# Patient Record
Sex: FEMALE | Race: WHITE | NOT HISPANIC OR LATINO | Employment: FULL TIME | URBAN - METROPOLITAN AREA
[De-identification: names, ages, dates, MRNs, and addresses within clinical notes are randomized per-mention and may not be internally consistent; named-entity substitution may affect disease eponyms.]

---

## 2017-01-05 ENCOUNTER — HOSPITAL ENCOUNTER (OUTPATIENT)
Dept: MRI IMAGING | Facility: HOSPITAL | Age: 47
Discharge: HOME | End: 2017-01-05
Attending: FAMILY MEDICINE | Admitting: FAMILY MEDICINE

## 2017-12-27 ENCOUNTER — HOSPITAL ENCOUNTER (OUTPATIENT)
Dept: OTHER | Facility: HOSPITAL | Age: 47
Discharge: HOME | End: 2017-12-27
Attending: FAMILY MEDICINE | Admitting: FAMILY MEDICINE

## 2018-08-21 ENCOUNTER — HOSPITAL ENCOUNTER (OUTPATIENT)
Dept: GENERAL RADIOLOGY | Facility: HOSPITAL | Age: 48
Discharge: HOME | End: 2018-08-21
Attending: FAMILY MEDICINE | Admitting: FAMILY MEDICINE

## 2019-03-13 ENCOUNTER — HOSPITAL ENCOUNTER (OUTPATIENT)
Dept: PHYSICAL THERAPY | Facility: HOSPITAL | Age: 49
Setting detail: RECURRING SERIES
Discharge: HOME | End: 2019-05-23
Attending: FAMILY MEDICINE | Admitting: FAMILY MEDICINE

## 2019-09-16 DIAGNOSIS — B02.9 HERPES ZOSTER WITHOUT COMPLICATION: Primary | ICD-10-CM

## 2019-09-16 RX ORDER — VALACYCLOVIR HYDROCHLORIDE 1 G/1
1000 TABLET, FILM COATED ORAL EVERY 8 HOURS
Qty: 21 TABLET | Refills: 0 | Status: SHIPPED | OUTPATIENT
Start: 2019-09-16 | End: 2019-10-14

## 2019-09-16 NOTE — TELEPHONE ENCOUNTER
Patient  Called said has shingles- patient said needs medicine for the shingles---send to [facility] on [facility]--call patient to advise  [phone]--unable to come in due to work

## 2019-09-23 ENCOUNTER — OFFICE VISIT (OUTPATIENT)
Dept: FAMILY MEDICINE CLINIC | Facility: CLINIC | Age: 49
End: 2019-09-23

## 2019-09-23 VITALS
DIASTOLIC BLOOD PRESSURE: 82 MMHG | SYSTOLIC BLOOD PRESSURE: 130 MMHG | RESPIRATION RATE: 16 BRPM | HEIGHT: 66 IN | OXYGEN SATURATION: 99 % | TEMPERATURE: 98.1 F | WEIGHT: 263 LBS | BODY MASS INDEX: 42.27 KG/M2 | HEART RATE: 90 BPM

## 2019-09-23 DIAGNOSIS — B02.9 HERPES ZOSTER WITHOUT COMPLICATION: Primary | ICD-10-CM

## 2019-09-23 DIAGNOSIS — J20.8 ACUTE BRONCHITIS DUE TO OTHER SPECIFIED ORGANISMS: ICD-10-CM

## 2019-09-23 DIAGNOSIS — J45.21 MILD INTERMITTENT ASTHMA WITH ACUTE EXACERBATION: ICD-10-CM

## 2019-09-23 DIAGNOSIS — R05.9 COUGH: ICD-10-CM

## 2019-09-23 PROBLEM — K21.9 GERD (GASTROESOPHAGEAL REFLUX DISEASE): Status: ACTIVE | Noted: 2019-09-23

## 2019-09-23 PROBLEM — Z86.19 HISTORY OF VARICELLA: Status: ACTIVE | Noted: 2019-09-23

## 2019-09-23 PROBLEM — F43.20 ADULT SITUATIONAL STRESS DISORDER: Status: ACTIVE | Noted: 2019-09-23

## 2019-09-23 PROBLEM — M54.10 RADICULOPATHY: Status: ACTIVE | Noted: 2019-09-23

## 2019-09-23 PROBLEM — J30.1 ALLERGIC RHINITIS DUE TO POLLEN: Status: ACTIVE | Noted: 2019-09-23

## 2019-09-23 PROBLEM — M19.90 ARTHRITIS: Status: ACTIVE | Noted: 2019-09-23

## 2019-09-23 PROBLEM — M50.30 DDD (DEGENERATIVE DISC DISEASE), CERVICAL: Status: ACTIVE | Noted: 2019-09-23

## 2019-09-23 PROBLEM — R94.39 ABNORMAL STRESS TEST: Status: ACTIVE | Noted: 2019-09-23

## 2019-09-23 PROBLEM — M72.2 PLANTAR FASCIITIS, RIGHT: Status: ACTIVE | Noted: 2019-09-23

## 2019-09-23 PROBLEM — M15.9 GENERALIZED OSTEOARTHRITIS: Status: ACTIVE | Noted: 2019-09-23

## 2019-09-23 PROBLEM — I83.93 ASYMPTOMATIC VARICOSE VEINS OF BILATERAL LOWER EXTREMITIES: Status: ACTIVE | Noted: 2019-09-23

## 2019-09-23 PROBLEM — F41.9 ANXIETY: Status: ACTIVE | Noted: 2019-09-23

## 2019-09-23 PROBLEM — E66.3 OVERWEIGHT: Status: ACTIVE | Noted: 2019-09-23

## 2019-09-23 PROBLEM — M79.7 FIBROMYALGIA: Status: ACTIVE | Noted: 2019-09-23

## 2019-09-23 PROBLEM — M51.37 DEGENERATIVE DISC DISEASE AT L5-S1 LEVEL: Status: ACTIVE | Noted: 2019-09-23

## 2019-09-23 PROBLEM — Z83.3 FAMILY HISTORY OF DIABETES MELLITUS: Status: ACTIVE | Noted: 2019-09-23

## 2019-09-23 PROBLEM — D50.9 IRON DEFICIENCY ANEMIA: Status: ACTIVE | Noted: 2019-09-23

## 2019-09-23 PROCEDURE — 99214 OFFICE O/P EST MOD 30 MIN: CPT | Performed by: FAMILY MEDICINE

## 2019-09-23 RX ORDER — PREDNISONE 1 MG/1
5 TABLET ORAL DAILY
Qty: 45 TABLET | Refills: 0 | Status: SHIPPED | OUTPATIENT
Start: 2019-09-23 | End: 2019-10-14

## 2019-09-23 RX ORDER — BUPROPION HYDROCHLORIDE 150 MG/1
150 TABLET ORAL DAILY
Refills: 5 | COMMUNITY
Start: 2019-09-01 | End: 2019-10-28 | Stop reason: SDUPTHER

## 2019-09-23 RX ORDER — LORATADINE 10 MG/1
CAPSULE, LIQUID FILLED ORAL
COMMUNITY
Start: 2014-04-16 | End: 2019-10-14

## 2019-09-23 RX ORDER — AZITHROMYCIN 250 MG/1
TABLET, FILM COATED ORAL
Qty: 6 TABLET | Refills: 0 | Status: SHIPPED | OUTPATIENT
Start: 2019-09-23 | End: 2019-10-14

## 2019-09-23 RX ORDER — ALBUTEROL SULFATE 90 UG/1
2 AEROSOL, METERED RESPIRATORY (INHALATION) EVERY 4 HOURS PRN
Qty: 1 INHALER | Refills: 0 | Status: SHIPPED | OUTPATIENT
Start: 2019-09-23 | End: 2019-10-14

## 2019-09-23 NOTE — PROGRESS NOTES
Subjective   Maria Del Carmen Esquivel is a 49 y.o. female.     URI    This is a new problem. The current episode started yesterday. The problem has been gradually worsening. There has been no fever. Associated symptoms include congestion, coughing, ear pain, headaches, a rash, sinus pain and sneezing. Pertinent negatives include no rhinorrhea, sore throat, swollen glands or wheezing. She has tried decongestant and NSAIDs for the symptoms.   Rash   This is a new problem. The current episode started 1 to 4 weeks ago. The problem has been rapidly improving since onset. Location: right flank. The rash is characterized by pain. Associated symptoms include congestion and coughing. Pertinent negatives include no eye pain, fever, rhinorrhea or sore throat. Treatments tried: acyclovir. The treatment provided significant relief.        The following portions of the patient's history were reviewed and updated as appropriate: allergies, current medications, past family history, past medical history, past social history, past surgical history and problem list.    Allergies:  Allergies   Allergen Reactions   • Beef-Derived Products Anaphylaxis   • Pork-Derived Products Anaphylaxis       Social History:  Social History     Socioeconomic History   • Marital status:      Spouse name: Not on file   • Number of children: Not on file   • Years of education: Not on file   • Highest education level: Not on file   Tobacco Use   • Smoking status: Never Smoker   • Smokeless tobacco: Never Used   Substance and Sexual Activity   • Alcohol use: No     Frequency: Never   • Drug use: No       Family History:  Family History   Problem Relation Age of Onset   • Hyperlipidemia Mother    • Diabetes Mother    • Heart disease Father    • Heart attack Father    • Hyperlipidemia Father    • Diabetes Father    • Heart disease Maternal Uncle        Past Medical History :  Patient Active Problem List   Diagnosis   • Abnormal stress test   • Adult  situational stress disorder   • Allergic rhinitis due to pollen   • Anxiety   • Arthritis   • Asymptomatic varicose veins of bilateral lower extremities   • Carpal tunnel syndrome   • DDD (degenerative disc disease), cervical   • Degenerative disc disease at L5-S1 level   • Family history of diabetes mellitus   • Fibromyalgia   • Generalized osteoarthritis   • GERD (gastroesophageal reflux disease)   • Herpes zoster without complication   • History of varicella   • Cervical radiculopathy   • Radiculopathy   • Iron deficiency anemia   • Mild intermittent asthma with acute exacerbation   • Overweight   • Plantar fasciitis, right       Medication List:    Current Outpatient Medications:   •  buPROPion XL (WELLBUTRIN XL) 150 MG 24 hr tablet, Take 150 mg by mouth Daily., Disp: , Rfl: 5  •  Loratadine (CLARITIN) 10 MG capsule, CLARITIN CAPS 10MG (LORATADINE CAPS), Disp: , Rfl:   •  valACYclovir (VALTREX) 1000 MG tablet, Take 1 tablet by mouth Every 8 (Eight) Hours., Disp: 21 tablet, Rfl: 0  •  albuterol sulfate  (90 Base) MCG/ACT inhaler, Inhale 2 puffs Every 4 (Four) Hours As Needed for Wheezing., Disp: 1 inhaler, Rfl: 0  •  azithromycin (ZITHROMAX) 250 MG tablet, Take 2 tablets the first day, then 1 tablet daily for 4 days., Disp: 6 tablet, Rfl: 0  •  predniSONE (DELTASONE) 5 MG tablet, Take 1 tablet by mouth Daily. 40mg x 3 days, 20mg x 3 days, 10mg x 3 days, 5mg x 3 days, Disp: 45 tablet, Rfl: 0    Past Surgical History:  Past Surgical History:   Procedure Laterality Date   • CHOLECYSTECTOMY     • LAPAROSCOPIC TUBAL LIGATION         Review of Systems:  Review of Systems   Constitutional: Negative for chills and fever.   HENT: Positive for congestion, ear pain and sneezing. Negative for postnasal drip, rhinorrhea, sinus pressure, sore throat, swollen glands and trouble swallowing.    Eyes: Negative for pain, redness and itching.   Respiratory: Positive for cough. Negative for wheezing.    Skin: Positive for  "rash.       Physical Exam:  Vital Signs:  Visit Vitals  /82   Pulse 90   Temp 98.1 °F (36.7 °C)   Resp 16   Ht 167.6 cm (66\")   Wt 119 kg (263 lb)   SpO2 99%   BMI 42.45 kg/m²       Physical Exam   Constitutional: She appears well-developed and well-nourished.   HENT:   Right Ear: External ear normal. A middle ear effusion is present.   Left Ear: External ear normal. A middle ear effusion is present.   Nose: Nose normal.   Mouth/Throat: Oropharynx is clear and moist. No oropharyngeal exudate.   Cardiovascular: Regular rhythm and normal heart sounds. Exam reveals no gallop and no friction rub.   No murmur heard.  Pulmonary/Chest: Effort normal and breath sounds normal. No respiratory distress. She has no wheezes. She has no rales.   Lymphadenopathy:     She has no cervical adenopathy.   Neurological: She is alert.   Skin: Skin is warm and dry.   Vitals reviewed.      Assessment and Plan:  Problem List Items Addressed This Visit        Respiratory    Mild intermittent asthma with acute exacerbation    Overview     exacerbated, inhaler as below. Increase fluids. Tylenol/motrin for pain or fever. Medication and medication adverse effects discussed.  Follow-up 5-7 days for reevaluation if not improved or sooner if needed.         Relevant Medications    albuterol sulfate  (90 Base) MCG/ACT inhaler    predniSONE (DELTASONE) 5 MG tablet       Other    Herpes zoster without complication - Primary    Overview     Right flank  better           Other Visit Diagnoses     Acute bronchitis due to other specified organisms        Relevant Medications    Loratadine (CLARITIN) 10 MG capsule    albuterol sulfate  (90 Base) MCG/ACT inhaler    azithromycin (ZITHROMAX) 250 MG tablet    Cough        Relevant Medications    azithromycin (ZITHROMAX) 250 MG tablet          An After Visit Summary and PPPS were given to the patient.     "

## 2019-09-30 ENCOUNTER — TELEPHONE (OUTPATIENT)
Dept: FAMILY MEDICINE CLINIC | Facility: CLINIC | Age: 49
End: 2019-09-30

## 2019-10-04 ENCOUNTER — TELEPHONE (OUTPATIENT)
Dept: FAMILY MEDICINE CLINIC | Facility: CLINIC | Age: 49
End: 2019-10-04

## 2019-10-04 NOTE — TELEPHONE ENCOUNTER
Pt states steroid has been completed but is still not feeling well. Pt would like to know if there is something else we can send to [facility] [place]

## 2019-10-04 NOTE — TELEPHONE ENCOUNTER
She states that she will go to after hours. She doesn't get off work until 7 PM and cannot wait until Monday.

## 2019-10-14 ENCOUNTER — OFFICE VISIT (OUTPATIENT)
Dept: FAMILY MEDICINE CLINIC | Facility: CLINIC | Age: 49
End: 2019-10-14

## 2019-10-14 VITALS
SYSTOLIC BLOOD PRESSURE: 132 MMHG | BODY MASS INDEX: 43.04 KG/M2 | WEIGHT: 267.8 LBS | HEIGHT: 66 IN | OXYGEN SATURATION: 99 % | TEMPERATURE: 97.5 F | RESPIRATION RATE: 19 BRPM | HEART RATE: 89 BPM | DIASTOLIC BLOOD PRESSURE: 88 MMHG

## 2019-10-14 DIAGNOSIS — J45.20 MILD INTERMITTENT ASTHMA WITHOUT COMPLICATION: Primary | ICD-10-CM

## 2019-10-14 DIAGNOSIS — J02.9 SORE THROAT: ICD-10-CM

## 2019-10-14 DIAGNOSIS — J30.1 SEASONAL ALLERGIC RHINITIS DUE TO POLLEN: ICD-10-CM

## 2019-10-14 LAB
EXPIRATION DATE: NORMAL
INTERNAL CONTROL: NORMAL
Lab: NORMAL
S PYO AG THROAT QL: NEGATIVE

## 2019-10-14 PROCEDURE — 99213 OFFICE O/P EST LOW 20 MIN: CPT | Performed by: FAMILY MEDICINE

## 2019-10-14 PROCEDURE — 87880 STREP A ASSAY W/OPTIC: CPT | Performed by: FAMILY MEDICINE

## 2019-10-14 RX ORDER — MONTELUKAST SODIUM 10 MG/1
10 TABLET ORAL NIGHTLY
Qty: 30 TABLET | Refills: 12 | Status: SHIPPED | OUTPATIENT
Start: 2019-10-14 | End: 2020-11-19 | Stop reason: SDUPTHER

## 2019-10-14 RX ORDER — CETIRIZINE HYDROCHLORIDE 10 MG/1
10 TABLET ORAL DAILY
Qty: 30 TABLET | Refills: 12
Start: 2019-10-14 | End: 2022-03-31

## 2019-10-14 RX ORDER — FLUTICASONE PROPIONATE 50 MCG
2 SPRAY, SUSPENSION (ML) NASAL DAILY
COMMUNITY

## 2019-10-14 RX ORDER — MONTELUKAST SODIUM 10 MG/1
10 TABLET ORAL NIGHTLY
Qty: 30 TABLET | Refills: 12 | Status: SHIPPED | OUTPATIENT
Start: 2019-10-14 | End: 2019-10-14 | Stop reason: SDUPTHER

## 2019-10-14 NOTE — PROGRESS NOTES
Subjective   Maria Del Carmen Esquivel is a 49 y.o. female.     Chief Complaint   Patient presents with   • Sore Throat     Present 3 weeks   • URI       Patient was seen by Dr. Shore 3 weeks ago. She was diagnosed with bronchitis. She has completed the medication therapy with somerelief. She was given systemic steroids at the time. She currently is using Mucinex D on a regular basis which does help her headaches only. She had shingles 4 weeks ago rx with anti viral meds and resolve without sequlae.       Sore Throat    This is a new problem. The current episode started 1 to 4 weeks ago. The problem has been gradually worsening. The pain is worse on the left side. There has been no fever. Associated symptoms include congestion, coughing, ear pain, headaches and shortness of breath. Pertinent negatives include no swollen glands or vomiting. Associated symptoms comments: Left ear worse than the right. Shortness of air is with exertion . She has had exposure to strep. Exposure to: Grandkids had strep roughly about 3 weeks ago. . Treatments tried: Mucinex D. Has been taking regularly since thursday.  The treatment provided no relief (There is no relief with sore throat and ear pain.  She is on Flonase and muscinex D).   URI    Chronicity:   The current episode started 1 to 4 weeks ago. The problem has been unchanged. There has been no fever. Associated symptoms include congestion, coughing, ear pain, headaches, sinus pain, sneezing, a sore throat and wheezing. Pertinent negatives include no dysuria, nausea, swollen glands or vomiting. She has tried inhaler use, decongestant and sleep (Uses a cool mist humidifer. ) for the symptoms. The treatment provided no relief.        The following portions of the patient's history were reviewed and updated as appropriate: allergies, current medications, past family history, past medical history, past social history, past surgical history and problem list.    Allergies:  Allergies   Allergen  Reactions   • Beef-Derived Products Anaphylaxis   • Pork-Derived Products Anaphylaxis       Social History:  Social History     Socioeconomic History   • Marital status:      Spouse name: Not on file   • Number of children: Not on file   • Years of education: Not on file   • Highest education level: Not on file   Tobacco Use   • Smoking status: Never Smoker   • Smokeless tobacco: Never Used   Substance and Sexual Activity   • Alcohol use: No     Frequency: Never   • Drug use: No       Family History:  Family History   Problem Relation Age of Onset   • Hyperlipidemia Mother    • Diabetes Mother    • Heart disease Father    • Heart attack Father    • Hyperlipidemia Father    • Diabetes Father    • Heart disease Maternal Uncle        Past Medical History :  Patient Active Problem List   Diagnosis   • Abnormal stress test   • Adult situational stress disorder   • Allergic rhinitis due to pollen   • Arthritis   • Asymptomatic varicose veins of bilateral lower extremities   • Carpal tunnel syndrome   • DDD (degenerative disc disease), cervical   • Degenerative disc disease at L5-S1 level   • Family history of diabetes mellitus   • Fibromyalgia   • Generalized osteoarthritis   • GERD (gastroesophageal reflux disease)   • Herpes zoster without complication   • History of varicella   • Cervical radiculopathy   • Iron deficiency anemia   • Mild intermittent asthma with acute exacerbation   • Overweight   • Plantar fasciitis, right       Medication List:    Current Outpatient Medications:   •  fluticasone (FLONASE) 50 MCG/ACT nasal spray, 2 sprays into the nostril(s) as directed by provider Daily., Disp: , Rfl:   •  buPROPion XL (WELLBUTRIN XL) 150 MG 24 hr tablet, Take 150 mg by mouth Daily., Disp: , Rfl: 5  •  cetirizine (zyrTEC) 10 MG tablet, Take 1 tablet by mouth Daily., Disp: 30 tablet, Rfl: 12  •  montelukast (SINGULAIR) 10 MG tablet, Take 1 tablet by mouth Every Night., Disp: 30 tablet, Rfl: 12    Past Surgical  "History:  Past Surgical History:   Procedure Laterality Date   • CHOLECYSTECTOMY     • LAPAROSCOPIC TUBAL LIGATION         Review of Systems:  Review of Systems   Constitutional: Positive for fatigue (no feeling well. ). Negative for fever.   HENT: Positive for congestion, ear pain, sneezing and sore throat. Negative for swollen glands.    Eyes: Negative for visual disturbance.   Respiratory: Positive for cough, shortness of breath and wheezing.    Gastrointestinal: Negative for abdominal distention, nausea and vomiting.   Endocrine: Negative for cold intolerance, heat intolerance, polydipsia and polyuria.   Genitourinary: Negative for dysuria, flank pain and frequency.   Musculoskeletal: Negative for arthralgias and myalgias.   Skin: Negative for pallor.   Neurological: Negative for weakness, numbness and headache.   Hematological: Negative for adenopathy.       Physical Exam:  Vital Signs:  Visit Vitals  /88 (BP Location: Left arm, Patient Position: Sitting, Cuff Size: Large Adult)   Pulse 89   Temp 97.5 °F (36.4 °C) (Oral)   Resp 19   Ht 167.6 cm (66\")   Wt 121 kg (267 lb 12.8 oz)   SpO2 99%   BMI 43.22 kg/m²       Physical Exam   Constitutional: She is oriented to person, place, and time. She appears well-developed and well-nourished. No distress.   HENT:   Right Ear: Tympanic membrane and external ear normal.   Left Ear: Tympanic membrane and external ear normal.   Mouth/Throat: Oropharynx is clear and moist.   Moderate post nasal secretions.    Eyes: Conjunctivae are normal.   Neck: Neck supple. No thyromegaly present.   Cardiovascular: Normal rate, regular rhythm, normal heart sounds and intact distal pulses. Exam reveals no gallop and no friction rub.   No murmur heard.  Pulmonary/Chest: Effort normal and breath sounds normal. No respiratory distress. She has no wheezes (She has had recent exacerbation of Astma resolve with systemic steroids. ). She has no rales.   Musculoskeletal: She exhibits no " edema.   Lymphadenopathy:     She has no cervical adenopathy.   Neurological: She is alert and oriented to person, place, and time. Coordination normal.   Skin: Skin is warm. No rash noted. No erythema.   Psychiatric: She has a normal mood and affect.   Vitals reviewed.      Assessment and Plan:  Problem List Items Addressed This Visit        Unprioritized    Allergic rhinitis due to pollen    Overview     Resume Flonase and anti histamines avoidance and follow.          Mild intermittent asthma with acute exacerbation - Primary    Overview     Stable after systemic steroids.   Rx allergies maximally ad montelukast and follow         Relevant Medications    cetirizine (zyrTEC) 10 MG tablet    montelukast (SINGULAIR) 10 MG tablet      Other Visit Diagnoses     Sore throat        negative strep screen  Secondary to allergic rhinitis.     Relevant Orders    POCT rapid strep A (Completed)          An After Visit Summary and PPPS were given to the patient.

## 2019-10-28 ENCOUNTER — PATIENT MESSAGE (OUTPATIENT)
Dept: FAMILY MEDICINE CLINIC | Facility: CLINIC | Age: 49
End: 2019-10-28

## 2019-10-28 DIAGNOSIS — M79.7 FIBROMYALGIA: ICD-10-CM

## 2019-10-28 DIAGNOSIS — M54.12 CERVICAL RADICULOPATHY: Primary | ICD-10-CM

## 2019-10-28 DIAGNOSIS — F43.20 ADULT SITUATIONAL STRESS DISORDER: ICD-10-CM

## 2019-10-28 RX ORDER — NAPROXEN 500 MG/1
500 TABLET ORAL AS NEEDED
COMMUNITY
End: 2022-07-28

## 2019-10-28 RX ORDER — NAPROXEN 500 MG/1
500 TABLET ORAL AS NEEDED
Qty: 30 TABLET | Refills: 6 | Status: CANCELLED | OUTPATIENT
Start: 2019-10-28

## 2019-11-04 RX ORDER — BUPROPION HYDROCHLORIDE 150 MG/1
150 TABLET ORAL DAILY
Qty: 30 TABLET | Refills: 6 | Status: SHIPPED | OUTPATIENT
Start: 2019-11-04 | End: 2020-08-18

## 2019-12-02 ENCOUNTER — TELEPHONE (OUTPATIENT)
Dept: FAMILY MEDICINE CLINIC | Facility: CLINIC | Age: 49
End: 2019-12-02

## 2019-12-02 NOTE — TELEPHONE ENCOUNTER
Regarding: RE: Non-Urgent Medical Question  Contact: [phone]  ----- Message from Mychart, Generic sent at 11/25/2019  8:56 AM EST -----    Debbie Byrne, my right heel is getting worse  it’s now swelling, very painful and throbbing. Guess I’m gonna have to be seen. I can go to an ortho if that’s where she wants me to do. I’m off Wednesday only this week  Thanks Maria Del Carmen   ----- Message -----  From: Jenny VARGHESE  Sent: 11/1/2019  8:00 AM EDT  To: Maria Del Carmen Esquivel  Subject: RE: Non-Urgent Medical Question  Let me know if you need to come in I can see what I can do.  Please call the pharmacy for your refills.  With the new system it works much better if you do that.  Don't take Naproxen with Ibuprofen     ----- Message -----     From: Maria Del Carmen Esquivel     Sent: 11/1/2019  6:23 AM EDT       To: Gloria Shore MD  Subject: RE: Non-Urgent Medical Question    I’m icing after work, stretching and taking ibuprofen for now, it helps so let me see if it goes away. Did you get my request for a  buspar and naproxen refill. I called last week and left message with .   Thanks Maria Del Carmen   ----- Message -----  From: Jenny VARGHESE  Sent: 10/30/2019 10:30 AM EDT  To: Maria Del Carmen Esquivel  Subject: RE: Non-Urgent Medical Question  Do you want to be seen here or see a podiatrist for this?     ----- Message -----     From: Maria Del Carmen Esquivel     Sent: 10/28/2019  3:25 PM EDT       To: Gloria Shore MD  Subject: Non-Urgent Medical Question    I signed up Yay!  Also I had my flu shot last week on 10/21 through work. And I sent you a message today about my right heel, severe tendonitis X’s Several weeks now but worse today! I’ve been icing, stretching and taking 200mg of naproxen today Q8hrs.     Thanks Maria Del Carmen

## 2020-03-24 ENCOUNTER — TELEPHONE (OUTPATIENT)
Dept: FAMILY MEDICINE CLINIC | Facility: CLINIC | Age: 50
End: 2020-03-24

## 2020-03-24 NOTE — TELEPHONE ENCOUNTER
Spoke with patient. She had a coworker test positive for covid 19. She only has GI symptom. Advised to self quarantine for 14 days and call health dept hotline.

## 2020-03-27 ENCOUNTER — TELEPHONE (OUTPATIENT)
Dept: FAMILY MEDICINE CLINIC | Facility: CLINIC | Age: 50
End: 2020-03-27

## 2020-03-27 DIAGNOSIS — B02.9 HERPES ZOSTER WITHOUT COMPLICATION: Primary | ICD-10-CM

## 2020-03-27 RX ORDER — ACYCLOVIR 800 MG/1
800 TABLET ORAL
Qty: 50 TABLET | Refills: 2 | Status: SHIPPED | OUTPATIENT
Start: 2020-03-27 | End: 2020-06-04

## 2020-03-27 NOTE — TELEPHONE ENCOUNTER
Acyclovir is fine. Send me a request please. No she should not go to work for a few days with the shingles

## 2020-03-27 NOTE — TELEPHONE ENCOUNTER
Pt called and reported that she was having GI sx last week and is now having a rash of 3 blisters on her left shoulder running up toward her neck. She states it feels like shingles she has previously had them. 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 She asked about taking acyclovir.

## 2020-03-27 NOTE — TELEPHONE ENCOUNTER
Patient is scheduled to work on Monday Tuesday and Friday. Please send a note to her [email or web address] account along with the medication to  [facility]

## 2020-04-02 ENCOUNTER — TELEPHONE (OUTPATIENT)
Dept: FAMILY MEDICINE CLINIC | Facility: CLINIC | Age: 50
End: 2020-04-02

## 2020-04-02 NOTE — TELEPHONE ENCOUNTER
Pt called with concern that she is to going back to work and that she had 8 pt where she works at [employer] test positive for COVID. Pt concerned for her health and her  with his age. Pt didn't if she should return to work?

## 2020-04-02 NOTE — TELEPHONE ENCOUNTER
She should raise her concerns to her employer. There are no restrictions right now for taking people out of work unless there are symptoms of covid 19

## 2020-04-03 ENCOUNTER — TELEPHONE (OUTPATIENT)
Dept: FAMILY MEDICINE CLINIC | Facility: CLINIC | Age: 50
End: 2020-04-03

## 2020-04-03 NOTE — TELEPHONE ENCOUNTER
She stated that she has been calling and no one has called her back. She is wanting to get an lab order to check the antibodies to see if she has covid 19. Please call her at [phone]

## 2020-05-18 ENCOUNTER — TELEPHONE (OUTPATIENT)
Dept: FAMILY MEDICINE CLINIC | Facility: CLINIC | Age: 50
End: 2020-05-18

## 2020-05-20 ENCOUNTER — OFFICE VISIT (OUTPATIENT)
Dept: FAMILY MEDICINE CLINIC | Facility: CLINIC | Age: 50
End: 2020-05-20

## 2020-05-20 VITALS
TEMPERATURE: 98.2 F | BODY MASS INDEX: 42.17 KG/M2 | SYSTOLIC BLOOD PRESSURE: 114 MMHG | DIASTOLIC BLOOD PRESSURE: 80 MMHG | WEIGHT: 262.4 LBS | HEIGHT: 66 IN | OXYGEN SATURATION: 99 % | HEART RATE: 70 BPM | RESPIRATION RATE: 18 BRPM

## 2020-05-20 DIAGNOSIS — R60.9 EDEMA, UNSPECIFIED TYPE: Primary | ICD-10-CM

## 2020-05-20 DIAGNOSIS — R07.89 DISCOMFORT OF CHEST WALL: ICD-10-CM

## 2020-05-20 PROCEDURE — 99214 OFFICE O/P EST MOD 30 MIN: CPT | Performed by: FAMILY MEDICINE

## 2020-05-20 RX ORDER — BUSPIRONE HYDROCHLORIDE 10 MG/1
10 TABLET ORAL DAILY
COMMUNITY
Start: 2020-03-13 | End: 2022-08-18

## 2020-05-20 NOTE — ASSESSMENT & PLAN NOTE
Her records were reviewed from the ER.  Currently the edema is improved.  She was advised to either use compression stockings or take the Lasix provided by the ER.  Due to her new onset symptoms and family history consider congestive heart failure.  Her BNP was 93 and greater than 100 is diagnostic for CHF.

## 2020-05-20 NOTE — ASSESSMENT & PLAN NOTE
She may likely have costochondritis causing her chest pain.  Her records were reviewed from the hospital, which includes EKG and lab work.  She was encouraged to use Aleve to treat her symptoms.  She was offered a stress test for further evaluation but she declined at this time, she wanted to discuss with her  first.

## 2020-05-20 NOTE — PROGRESS NOTES
Chief Complaint   Patient presents with   • Edema     Lower extrimities        History of Present Illness:  Subjective   Maria Del Carmen Esquivel is a 49 y.o. female.   Leg Swelling   This is a new problem. The current episode started 1 to 4 weeks ago. The problem occurs daily. The problem has been gradually worsening. Associated symptoms include fatigue. Pertinent negatives include no abdominal pain, anorexia, arthralgias, change in bowel habit, chest pain, chills, congestion, coughing, diaphoresis, fever, headaches, joint swelling, myalgias, nausea, neck pain, numbness, rash, sore throat, swollen glands, urinary symptoms, vertigo, visual change, vomiting or weakness. Associated symptoms comments: The patient states that for a week or so now she has been dealing with lower extremity issues. She states that she has been having some swelling in both her lower legs. She states that she does stand on her legs some part of the day but not all day. She states that she has not done anything new out of her ordinary routine that could be causing this. She states that it starts earlier in the day and she states that by the time she gets home she has a lot of swelling in them. She states that they are not painful just swollen. She states that she has not had any redness in her legs either. She states that she went to the emergency room Monday and she states that she was given lasix to have filled but she states that she has not done that yet. She states that she had chest pain, right arm pain, pitting edema, SOB with exertion. She states that she was cleared by er. She states that D-Dimer was elevated and she states that they told her it was a false positive. She states that they did ultrasound of legs and CT with contrast of chest. With these being negative they told her is was probably due to her weight that it showed a false positive.  She states that the swelling has gotten worse in her opinion. She states that she has not got  the medication filled as she states that she worked yesterday. She states that she went to [facility]. Troponin level was done or at least she thinks according to what they told her they was doing. She states that EKG was done as well and chest xray. She states that she is really fatigued and she states that she is not feeling herself. . Nothing aggravates the symptoms. She has tried nothing for the symptoms. The treatment provided no relief.        Allergies:  Allergies   Allergen Reactions   • Beef-Derived Products Anaphylaxis   • Pork-Derived Products Anaphylaxis       Social History:  Social History     Socioeconomic History   • Marital status:      Spouse name: Not on file   • Number of children: Not on file   • Years of education: Not on file   • Highest education level: Not on file   Tobacco Use   • Smoking status: Never Smoker   • Smokeless tobacco: Never Used   Substance and Sexual Activity   • Alcohol use: No     Frequency: Never   • Drug use: No       Family History:  Family History   Problem Relation Age of Onset   • Hyperlipidemia Mother    • Diabetes Mother    • Heart disease Father    • Heart attack Father    • Hyperlipidemia Father    • Diabetes Father    • Heart disease Maternal Uncle        Past Medical History :  Active Ambulatory Problems     Diagnosis Date Noted   • Abnormal stress test 09/23/2019   • Adult situational stress disorder 09/23/2019   • Allergic rhinitis due to pollen 09/23/2019   • Arthritis 09/23/2019   • Asymptomatic varicose veins of bilateral lower extremities 09/23/2019   • Carpal tunnel syndrome 04/16/2014   • DDD (degenerative disc disease), cervical 09/23/2019   • Degenerative disc disease at L5-S1 level 09/23/2019   • Family history of diabetes mellitus 09/23/2019   • Fibromyalgia 09/23/2019   • Generalized osteoarthritis 09/23/2019   • GERD (gastroesophageal reflux disease) 09/23/2019   • Herpes zoster without complication 09/23/2019   • History of varicella 09/23/2019    • Cervical radiculopathy 04/16/2014   • Iron deficiency anemia 09/23/2019   • Mild intermittent asthma with acute exacerbation 09/23/2019   • Overweight 09/23/2019   • Plantar fasciitis, right 09/23/2019   • Edema 05/20/2020   • Discomfort of chest wall 05/20/2020     Resolved Ambulatory Problems     Diagnosis Date Noted   • No Resolved Ambulatory Problems     Past Medical History:   Diagnosis Date   • Anxiety 9/23/2019   • Radiculopathy 9/23/2019       Medication List:  Outpatient Encounter Medications as of 5/20/2020   Medication Sig Dispense Refill   • acyclovir (ZOVIRAX) 800 MG tablet Take 1 tablet by mouth 5 (Five) Times a Day. 50 tablet 2   • buPROPion XL (WELLBUTRIN XL) 150 MG 24 hr tablet Take 1 tablet by mouth Daily. 30 tablet 6   • busPIRone (BUSPAR) 10 MG tablet Take 10 mg by mouth Daily.     • cetirizine (zyrTEC) 10 MG tablet Take 1 tablet by mouth Daily. 30 tablet 12   • fluticasone (FLONASE) 50 MCG/ACT nasal spray 2 sprays into the nostril(s) as directed by provider Daily.     • montelukast (SINGULAIR) 10 MG tablet Take 1 tablet by mouth Every Night. 30 tablet 12   • naproxen (NAPROSYN) 500 MG tablet Take 500 mg by mouth As Needed.       No facility-administered encounter medications on file as of 5/20/2020.        Past Surgical History:  Past Surgical History:   Procedure Laterality Date   • CHOLECYSTECTOMY     • LAPAROSCOPIC TUBAL LIGATION          The following portions of the patient's history were reviewed and updated as appropriate: allergies, current medications, past family history, past medical history, past social history, past surgical history and problem list.    Review Of Systems:  Review of Systems   Constitutional: Positive for fatigue. Negative for chills, diaphoresis and fever.   HENT: Negative for congestion, sore throat and swollen glands.    Respiratory: Negative for cough.         Chest discomfort   Cardiovascular: Negative for chest pain.   Gastrointestinal: Negative for  "abdominal pain, anorexia, change in bowel habit, nausea and vomiting.   Musculoskeletal: Negative for arthralgias, joint swelling, myalgias and neck pain.   Skin: Negative for rash.        Bilateral pitting edema   Neurological: Negative for vertigo, weakness and numbness.       Objective     Physical Exam:  Vital Signs:  Visit Vitals  /80   Pulse 70   Temp 98.2 °F (36.8 °C)   Resp 18   Ht 167.6 cm (66\")   Wt 119 kg (262 lb 6.4 oz)   SpO2 99%   BMI 42.35 kg/m²       Physical Exam   Constitutional: She is oriented to person, place, and time. She appears well-developed and well-nourished.   HENT:   Head: Normocephalic.   Right Ear: External ear normal.   Left Ear: External ear normal.   Nose: Nose normal.   Eyes: Conjunctivae are normal.   Neck: Normal range of motion. Neck supple.   Cardiovascular: Normal rate and regular rhythm.   Pulmonary/Chest: Effort normal and breath sounds normal. She exhibits tenderness.       Musculoskeletal: Normal range of motion.   Neurological: She is alert and oriented to person, place, and time.   Skin: Skin is warm and dry. Capillary refill takes less than 2 seconds.   Vitals reviewed.      Assessment/Plan   Assessment and Plan:  Diagnoses and all orders for this visit:    1. Edema, unspecified type (Primary)  Assessment & Plan:  Her records were reviewed from the ER.  Currently the edema is improved.  She was advised to either use compression stockings or take the Lasix provided by the ER.  Due to her new onset symptoms and family history consider congestive heart failure.  Her BNP was 93 and greater than 100 is diagnostic for CHF.      2. Discomfort of chest wall  Assessment & Plan:  She may likely have costochondritis causing her chest pain.  Her records were reviewed from the hospital, which includes EKG and lab work.  She was encouraged to use Aleve to treat her symptoms.  She was offered a stress test for further evaluation but she declined at this time, she wanted to " discuss with her  first.

## 2020-06-04 ENCOUNTER — OFFICE VISIT (OUTPATIENT)
Dept: FAMILY MEDICINE CLINIC | Facility: CLINIC | Age: 50
End: 2020-06-04

## 2020-06-04 VITALS
SYSTOLIC BLOOD PRESSURE: 122 MMHG | RESPIRATION RATE: 18 BRPM | BODY MASS INDEX: 42.11 KG/M2 | WEIGHT: 262 LBS | HEIGHT: 66 IN | TEMPERATURE: 98 F | OXYGEN SATURATION: 98 % | HEART RATE: 99 BPM | DIASTOLIC BLOOD PRESSURE: 74 MMHG

## 2020-06-04 DIAGNOSIS — R60.9 EDEMA, UNSPECIFIED TYPE: Primary | ICD-10-CM

## 2020-06-04 DIAGNOSIS — J45.20 MILD INTERMITTENT ASTHMA WITHOUT COMPLICATION: ICD-10-CM

## 2020-06-04 PROCEDURE — 99214 OFFICE O/P EST MOD 30 MIN: CPT | Performed by: FAMILY MEDICINE

## 2020-06-04 RX ORDER — ALBUTEROL SULFATE 90 UG/1
2 AEROSOL, METERED RESPIRATORY (INHALATION) EVERY 4 HOURS PRN
Qty: 1 INHALER | Refills: 12 | Status: SHIPPED | OUTPATIENT
Start: 2020-06-04 | End: 2021-04-12

## 2020-06-04 RX ORDER — LORATADINE 10 MG/1
CAPSULE, LIQUID FILLED ORAL
COMMUNITY

## 2020-06-04 NOTE — PROGRESS NOTES
Subjective   Maria Del Carmen Esquivel is a 50 y.o. female.     Chief Complaint   Patient presents with   • Shortness of Breath   • Leg Swelling       Shortness of Breath   This is a chronic problem. The current episode started more than 1 year ago. The problem occurs daily. The problem has been waxing and waning. Duration: varies with situation. Associated symptoms include headaches. Pertinent negatives include no abdominal pain, chest pain, ear pain, fever, rash, rhinorrhea, vomiting or wheezing. The symptoms are aggravated by exercise and any activity. The patient has no known risk factors for DVT/PE. She has tried nothing for the symptoms. The treatment provided mild relief.   Leg Swelling   This is a chronic problem. The current episode started more than 1 year ago. The problem occurs intermittently. The problem has been waxing and waning. Associated symptoms include headaches. Pertinent negatives include no abdominal pain, arthralgias, chest pain, coughing, fever, nausea, rash or vomiting. The symptoms are aggravated by eating. She has tried drinking for the symptoms. The treatment provided mild relief.    she had work up in ER. Duplex and CT pe protocol were negative.     Both issues are still there but not as bad. She was found to have costochondritis.     The following portions of the patient's history were reviewed and updated as appropriate: allergies, current medications, past family history, past medical history, past social history, past surgical history and problem list.    Allergies:  Allergies   Allergen Reactions   • Beef-Derived Products Anaphylaxis   • Pork-Derived Products Anaphylaxis       Social History:  Social History     Socioeconomic History   • Marital status:      Spouse name: Not on file   • Number of children: Not on file   • Years of education: Not on file   • Highest education level: Not on file   Tobacco Use   • Smoking status: Never Smoker   • Smokeless tobacco: Never Used    Substance and Sexual Activity   • Alcohol use: No     Frequency: Never   • Drug use: No       Family History:  Family History   Problem Relation Age of Onset   • Hyperlipidemia Mother    • Diabetes Mother    • Heart disease Father    • Heart attack Father    • Hyperlipidemia Father    • Diabetes Father    • Heart disease Maternal Uncle        Past Medical History :  Patient Active Problem List   Diagnosis   • Abnormal stress test   • Adult situational stress disorder   • Allergic rhinitis due to pollen   • Arthritis   • Asymptomatic varicose veins of bilateral lower extremities   • Carpal tunnel syndrome   • DDD (degenerative disc disease), cervical   • Degenerative disc disease at L5-S1 level   • Family history of diabetes mellitus   • Fibromyalgia   • Generalized osteoarthritis   • GERD (gastroesophageal reflux disease)   • Herpes zoster without complication   • History of varicella   • Cervical radiculopathy   • Iron deficiency anemia   • Mild intermittent asthma without complication   • Overweight   • Plantar fasciitis, right   • Edema   • Discomfort of chest wall       Medication List:    Current Outpatient Medications:   •  buPROPion XL (WELLBUTRIN XL) 150 MG 24 hr tablet, Take 1 tablet by mouth Daily., Disp: 30 tablet, Rfl: 6  •  busPIRone (BUSPAR) 10 MG tablet, Take 10 mg by mouth Daily., Disp: , Rfl:   •  Loratadine (Claritin) 10 MG capsule, Take  by mouth., Disp: , Rfl:   •  montelukast (SINGULAIR) 10 MG tablet, Take 1 tablet by mouth Every Night., Disp: 30 tablet, Rfl: 12  •  naproxen (NAPROSYN) 500 MG tablet, Take 500 mg by mouth As Needed., Disp: , Rfl:   •  albuterol sulfate  (90 Base) MCG/ACT inhaler, Inhale 2 puffs Every 4 (Four) Hours As Needed for Wheezing., Disp: 1 inhaler, Rfl: 12  •  cetirizine (zyrTEC) 10 MG tablet, Take 1 tablet by mouth Daily., Disp: 30 tablet, Rfl: 12  •  fluticasone (FLONASE) 50 MCG/ACT nasal spray, 2 sprays into the nostril(s) as directed by provider Daily., Disp:  ", Rfl:     Past Surgical History:  Past Surgical History:   Procedure Laterality Date   • CHOLECYSTECTOMY     • LAPAROSCOPIC TUBAL LIGATION         Review of Systems:  Review of Systems   Constitutional: Negative for activity change and fever.   HENT: Negative for ear pain, rhinorrhea, sinus pressure and voice change.    Eyes: Negative for visual disturbance.   Respiratory: Positive for shortness of breath. Negative for cough and wheezing.    Cardiovascular: Negative for chest pain.   Gastrointestinal: Negative for abdominal pain, diarrhea, nausea and vomiting.   Endocrine: Negative for cold intolerance and heat intolerance.   Genitourinary: Negative for frequency and urgency.   Musculoskeletal: Negative for arthralgias.   Skin: Negative for rash.   Neurological: Negative for syncope.   Hematological: Does not bruise/bleed easily.   Psychiatric/Behavioral: Negative for depressed mood. The patient is not nervous/anxious.        Physical Exam:  Vital Signs:  Visit Vitals  /74 (BP Location: Right arm)   Pulse 99   Temp 98 °F (36.7 °C)   Resp 18   Ht 167.6 cm (66\")   Wt 119 kg (262 lb)   SpO2 98%   BMI 42.29 kg/m²       Physical Exam   Constitutional: She is oriented to person, place, and time. She appears well-developed and well-nourished. She is cooperative.   Cardiovascular: Normal rate, regular rhythm and normal heart sounds. Exam reveals no gallop and no friction rub.   No murmur heard.  Pulmonary/Chest: Effort normal and breath sounds normal. She has no wheezes. She has no rales.   Neurological: She is alert and oriented to person, place, and time. Coordination normal.   Skin: Skin is warm and dry.   Psychiatric: She has a normal mood and affect.   Vitals reviewed.      Assessment and Plan:  Problem List Items Addressed This Visit        Respiratory    Mild intermittent asthma without complication    Overview     Start rescue inhaler sent         Relevant Medications    albuterol sulfate  (90 Base) " MCG/ACT inhaler       Other    Edema - Primary    Current Assessment & Plan     Resolved with stopping soda               An After Visit Summary and PPPS were given to the patient.         I wore protective equipment throughout this patient encounter to include mask, gloves and eye protection. Hand hygiene was performed before donning protective equipment and after removal when leaving the room.

## 2020-08-18 DIAGNOSIS — F43.20 ADULT SITUATIONAL STRESS DISORDER: ICD-10-CM

## 2020-08-18 RX ORDER — BUPROPION HYDROCHLORIDE 150 MG/1
TABLET ORAL
Qty: 90 TABLET | Refills: 2 | Status: SHIPPED | OUTPATIENT
Start: 2020-08-18 | End: 2022-08-18

## 2020-11-17 DIAGNOSIS — J45.20 MILD INTERMITTENT ASTHMA WITHOUT COMPLICATION: ICD-10-CM

## 2020-11-19 RX ORDER — MONTELUKAST SODIUM 10 MG/1
TABLET ORAL
Qty: 90 TABLET | Refills: 4 | Status: SHIPPED | OUTPATIENT
Start: 2020-11-19 | End: 2021-12-07

## 2021-04-12 ENCOUNTER — E-VISIT (OUTPATIENT)
Dept: FAMILY MEDICINE CLINIC | Facility: CLINIC | Age: 51
End: 2021-04-12

## 2021-04-12 ENCOUNTER — TELEMEDICINE (OUTPATIENT)
Dept: FAMILY MEDICINE CLINIC | Facility: CLINIC | Age: 51
End: 2021-04-12

## 2021-04-12 DIAGNOSIS — J30.1 SEASONAL ALLERGIC RHINITIS DUE TO POLLEN: Primary | ICD-10-CM

## 2021-04-12 DIAGNOSIS — J01.00 ACUTE NON-RECURRENT MAXILLARY SINUSITIS: ICD-10-CM

## 2021-04-12 PROCEDURE — 99213 OFFICE O/P EST LOW 20 MIN: CPT | Performed by: FAMILY MEDICINE

## 2021-04-12 RX ORDER — FLUCONAZOLE 150 MG/1
150 TABLET ORAL ONCE
Qty: 1 TABLET | Refills: 1 | Status: SHIPPED | OUTPATIENT
Start: 2021-04-12 | End: 2021-04-12

## 2021-04-12 RX ORDER — CEFDINIR 300 MG/1
300 CAPSULE ORAL 2 TIMES DAILY
Qty: 20 CAPSULE | Refills: 0 | Status: SHIPPED | OUTPATIENT
Start: 2021-04-12 | End: 2022-01-03

## 2021-04-12 NOTE — PROGRESS NOTES
Subjective   Maria Del Carmen Esquivel is a 50 y.o. female.     Chief Complaint   Patient presents with   • Sinusitis   • Cough       Type of visit and consent:    This is a video visit. The use of a video visit has been reviewed with the patient and verbal informed consent has been obtained.     URI   This is a new problem. The current episode started in the past 7 days. The problem has been unchanged. There has been no fever. Associated symptoms include congestion, coughing, headaches, sinus pain, sneezing and a sore throat. She has tried antihistamine, decongestant and acetaminophen for the symptoms. The treatment provided no relief.      She is taking her singulair and mucinex. She has a lot of congestion. Her cough is worse at night.     The following portions of the patient's history were reviewed and updated as appropriate: allergies, current medications, past family history, past medical history, past social history, past surgical history and problem list.    Allergies:  Allergies   Allergen Reactions   • Beef-Derived Products Anaphylaxis   • Pork-Derived Products Anaphylaxis       Social History:  Social History     Socioeconomic History   • Marital status:      Spouse name: Not on file   • Number of children: Not on file   • Years of education: Not on file   • Highest education level: Not on file   Tobacco Use   • Smoking status: Never Smoker   • Smokeless tobacco: Never Used   Substance and Sexual Activity   • Alcohol use: No   • Drug use: No       Family History:  Family History   Problem Relation Age of Onset   • Hyperlipidemia Mother    • Diabetes Mother    • Heart disease Father    • Heart attack Father    • Hyperlipidemia Father    • Diabetes Father    • Heart disease Maternal Uncle        Past Medical History :  Patient Active Problem List   Diagnosis   • Abnormal stress test   • Adult situational stress disorder   • Allergic rhinitis due to pollen   • Arthritis   • Asymptomatic varicose veins of  bilateral lower extremities   • Carpal tunnel syndrome   • DDD (degenerative disc disease), cervical   • Degenerative disc disease at L5-S1 level   • Family history of diabetes mellitus   • Fibromyalgia   • Generalized osteoarthritis   • GERD (gastroesophageal reflux disease)   • Herpes zoster without complication   • History of varicella   • Cervical radiculopathy   • Iron deficiency anemia   • Mild intermittent asthma without complication   • Overweight   • Plantar fasciitis, right   • Edema   • Discomfort of chest wall   • Acute non-recurrent maxillary sinusitis       Medication List:    Current Outpatient Medications:   •  buPROPion XL (WELLBUTRIN XL) 150 MG 24 hr tablet, TAKE 1 TABLET BY MOUTH EVERY DAY, Disp: 90 tablet, Rfl: 2  •  busPIRone (BUSPAR) 10 MG tablet, Take 10 mg by mouth Daily., Disp: , Rfl:   •  cefdinir (OMNICEF) 300 MG capsule, Take 1 capsule by mouth 2 (Two) Times a Day. May turn stool orange, Disp: 20 capsule, Rfl: 0  •  cetirizine (zyrTEC) 10 MG tablet, Take 1 tablet by mouth Daily., Disp: 30 tablet, Rfl: 12  •  fluconazole (DIFLUCAN) 150 MG tablet, Take 1 tablet by mouth 1 (One) Time for 1 dose., Disp: 1 tablet, Rfl: 1  •  fluticasone (FLONASE) 50 MCG/ACT nasal spray, 2 sprays into the nostril(s) as directed by provider Daily., Disp: , Rfl:   •  Loratadine (Claritin) 10 MG capsule, Take  by mouth., Disp: , Rfl:   •  montelukast (SINGULAIR) 10 MG tablet, TAKE 1 TABLET BY MOUTH EVERY DAY AT NIGHT, Disp: 90 tablet, Rfl: 4  •  naproxen (NAPROSYN) 500 MG tablet, Take 500 mg by mouth As Needed., Disp: , Rfl:     Past Surgical History:  Past Surgical History:   Procedure Laterality Date   • CHOLECYSTECTOMY     • LAPAROSCOPIC TUBAL LIGATION         Review of Systems:  Review of Systems   Constitutional: Negative for activity change and fever.   HENT: Negative for ear pain, rhinorrhea, sinus pressure and voice change.    Eyes: Negative for visual disturbance.   Respiratory: Negative for cough and  shortness of breath.    Cardiovascular: Negative for chest pain.   Gastrointestinal: Negative for abdominal pain, diarrhea, nausea and vomiting.   Endocrine: Negative for cold intolerance and heat intolerance.   Genitourinary: Negative for frequency and urgency.   Musculoskeletal: Negative for arthralgias.   Skin: Negative for rash.   Neurological: Negative for syncope.   Hematological: Does not bruise/bleed easily.   Psychiatric/Behavioral: Negative for depressed mood. The patient is not nervous/anxious.        Physical Exam:  Vital Signs:  There were no vitals taken for this visit.    Physical Exam   Constitutional: She appears well-developed and well-nourished.   HENT:   Head: Normocephalic and atraumatic.   Right Ear: External ear normal.   Left Ear: External ear normal.   Eyes: Pupils are equal, round, and reactive to light. Conjunctivae and EOM are normal. Right eye exhibits no discharge. Left eye exhibits no discharge.   Pulmonary/Chest: Effort normal.  No respiratory distress. She no audible wheeze...  Neurological: She is alert. No cranial nerve deficit.   Psychiatric: She has a normal mood and affect.       Assessment and Plan:  Problem List Items Addressed This Visit        Allergies and Adverse Reactions    Allergic rhinitis due to pollen - Primary    Overview     Resume Flonase and anti histamines avoidance and follow.             ENT    Acute non-recurrent maxillary sinusitis    Overview     increase fluids, tylenol for fever, motrin for pain. Humidifier to help with congestion and to sleep at night. Dicussed OTC meds, gargle with warm salt water. If there is recurrent fever, shortness of breath, lethargy, advised to come in to the office or go to the ER.           Current Assessment & Plan     increase fluids, tylenol for fever, motrin for pain. Humidifier to help with congestion and to sleep at night. Dicussed OTC meds, gargle with warm salt water. If there is recurrent fever, shortness of breath,  lethargy, advised to come in to the office or go to the ER.           Relevant Medications    cefdinir (OMNICEF) 300 MG capsule    fluconazole (DIFLUCAN) 150 MG tablet                     Time spent : 15 min

## 2021-08-19 ENCOUNTER — TELEPHONE (OUTPATIENT)
Dept: FAMILY MEDICINE CLINIC | Facility: CLINIC | Age: 51
End: 2021-08-19

## 2021-08-19 ENCOUNTER — E-VISIT (OUTPATIENT)
Dept: FAMILY MEDICINE CLINIC | Facility: CLINIC | Age: 51
End: 2021-08-19

## 2021-08-19 DIAGNOSIS — J01.00 ACUTE NON-RECURRENT MAXILLARY SINUSITIS: ICD-10-CM

## 2021-08-19 DIAGNOSIS — J30.1 SEASONAL ALLERGIC RHINITIS DUE TO POLLEN: Primary | ICD-10-CM

## 2021-08-19 PROCEDURE — 99421 OL DIG E/M SVC 5-10 MIN: CPT | Performed by: FAMILY MEDICINE

## 2021-08-19 RX ORDER — AZITHROMYCIN 250 MG/1
TABLET, FILM COATED ORAL
Qty: 6 TABLET | Refills: 0 | Status: SHIPPED | OUTPATIENT
Start: 2021-08-19 | End: 2022-01-03

## 2021-08-19 NOTE — PROGRESS NOTES
Woodstockgermania Manzanoenma    1970  [id number]    I have reviewed the e-Visit questionnaire and patient's answers, my assessment and plan are as follows:      See me next week if not better        Problems Addressed this Visit        Allergies and Adverse Reactions    Allergic rhinitis due to pollen - Primary     Resume flonase  contributing            ENT    Acute non-recurrent maxillary sinusitis     increase fluids, tylenol for fever, motrin for pain. Humidifier to help with congestion and to sleep at night. Dicussed OTC meds, gargle with warm salt water. If there is recurrent fever, shortness of breath, lethargy, advised to come in to the office or go to the ER.           Relevant Medications    azithromycin (ZITHROMAX) 250 MG tablet      Diagnoses       Codes Comments    Seasonal allergic rhinitis due to pollen    -  Primary ICD-10-CM: J30.1  ICD-9-CM: 477.0     Acute non-recurrent maxillary sinusitis     ICD-10-CM: J01.00  ICD-9-CM: 461.0            Any medications prescribed have been sent electronically to   [facility]/pharm[facility] #[id number] - [place], [place] - [address] - [phone] PH - [phone] FX  [address] [facility] [place] [address]  Phone: [phone] Fax: [phone]    [id number] - [facility] [place] - [address] - [phone] PH - [phone] FX  [address]  [place] [address]  Phone: [phone] Fax: [phone]        Gloria Shore MD  08/19/21  15:12 EDT

## 2021-08-19 NOTE — TELEPHONE ENCOUNTER
Caller: Maria Del Carmen Esquivel    Relationship: Self    Best call back number: [phone] (H)    What medication are you requesting: ANTIBIOTIC     What are your current symptoms: COUGH, HEADACHE, SINUS PRESSURE, SORE THROAT, EAR ACHE AND GREEN DRAINAGE    How long have you been experiencing symptoms: 5-6 DAYS AGO    Have you had these symptoms before:    [x] Yes  [] No    Have you been treated for these symptoms before:   [x] Yes  [] No    If a prescription is needed, what is your preferred pharmacy and phone number: [id number] - [place], [place] - [address] - [phone] [facility] [phone] FX         Additional notes: PATIENT CALLED TO CHECK THE STATUS OF HER PRESCRIPTION BEING SENT TO THE PHARMACY.        THANKS

## 2021-12-07 DIAGNOSIS — J45.20 MILD INTERMITTENT ASTHMA WITHOUT COMPLICATION: ICD-10-CM

## 2021-12-07 RX ORDER — MONTELUKAST SODIUM 10 MG/1
TABLET ORAL
Qty: 90 TABLET | Refills: 3 | Status: SHIPPED | OUTPATIENT
Start: 2021-12-07

## 2021-12-29 ENCOUNTER — TELEPHONE (OUTPATIENT)
Dept: FAMILY MEDICINE CLINIC | Facility: CLINIC | Age: 51
End: 2021-12-29

## 2022-01-03 ENCOUNTER — OFFICE VISIT (OUTPATIENT)
Dept: FAMILY MEDICINE CLINIC | Facility: CLINIC | Age: 52
End: 2022-01-03

## 2022-01-03 VITALS
HEART RATE: 83 BPM | SYSTOLIC BLOOD PRESSURE: 119 MMHG | HEIGHT: 66 IN | RESPIRATION RATE: 18 BRPM | BODY MASS INDEX: 43.07 KG/M2 | DIASTOLIC BLOOD PRESSURE: 79 MMHG | OXYGEN SATURATION: 98 % | WEIGHT: 268 LBS | TEMPERATURE: 98.2 F

## 2022-01-03 DIAGNOSIS — H92.03 ACUTE EAR PAIN, BILATERAL: ICD-10-CM

## 2022-01-03 DIAGNOSIS — J45.20 MILD INTERMITTENT ASTHMA WITHOUT COMPLICATION: ICD-10-CM

## 2022-01-03 DIAGNOSIS — R30.0 DYSURIA: Primary | ICD-10-CM

## 2022-01-03 DIAGNOSIS — K21.9 GASTROESOPHAGEAL REFLUX DISEASE, UNSPECIFIED WHETHER ESOPHAGITIS PRESENT: ICD-10-CM

## 2022-01-03 PROBLEM — J45.909 REACTIVE AIRWAY DISEASE WITHOUT COMPLICATION: Status: RESOLVED | Noted: 2022-01-03 | Resolved: 2022-01-03

## 2022-01-03 PROBLEM — J45.909 REACTIVE AIRWAY DISEASE WITHOUT COMPLICATION: Status: ACTIVE | Noted: 2022-01-03

## 2022-01-03 PROBLEM — J01.00 ACUTE NON-RECURRENT MAXILLARY SINUSITIS: Status: RESOLVED | Noted: 2021-04-12 | Resolved: 2022-01-03

## 2022-01-03 LAB
BILIRUB BLD-MCNC: NEGATIVE MG/DL
CLARITY, POC: CLEAR
COLOR UR: YELLOW
GLUCOSE UR STRIP-MCNC: NEGATIVE MG/DL
KETONES UR QL: NEGATIVE
LEUKOCYTE EST, POC: NEGATIVE
NITRITE UR-MCNC: NEGATIVE MG/ML
PH UR: 5 [PH] (ref 5–8)
PROT UR STRIP-MCNC: NEGATIVE MG/DL
RBC # UR STRIP: NEGATIVE /UL
SP GR UR: 1.02 (ref 1–1.03)
UROBILINOGEN UR QL: NORMAL

## 2022-01-03 PROCEDURE — 99214 OFFICE O/P EST MOD 30 MIN: CPT | Performed by: FAMILY MEDICINE

## 2022-01-03 PROCEDURE — 81003 URINALYSIS AUTO W/O SCOPE: CPT | Performed by: FAMILY MEDICINE

## 2022-01-03 RX ORDER — FLUCONAZOLE 150 MG/1
150 TABLET ORAL ONCE
Qty: 1 TABLET | Refills: 0 | Status: SHIPPED | OUTPATIENT
Start: 2022-01-03 | End: 2022-01-03

## 2022-01-03 RX ORDER — ALBUTEROL SULFATE 90 UG/1
2 AEROSOL, METERED RESPIRATORY (INHALATION) EVERY 4 HOURS PRN
Qty: 6.7 G | Refills: 5 | Status: SHIPPED | OUTPATIENT
Start: 2022-01-03 | End: 2023-01-12 | Stop reason: SDUPTHER

## 2022-01-03 RX ORDER — OMEPRAZOLE 20 MG/1
20 CAPSULE, DELAYED RELEASE ORAL DAILY
Qty: 30 CAPSULE | Refills: 3 | Status: SHIPPED | OUTPATIENT
Start: 2022-01-03 | End: 2022-01-25

## 2022-01-03 RX ORDER — SULFAMETHOXAZOLE AND TRIMETHOPRIM 800; 160 MG/1; MG/1
1 TABLET ORAL 2 TIMES DAILY
Qty: 20 TABLET | Refills: 0 | Status: SHIPPED | OUTPATIENT
Start: 2022-01-03 | End: 2022-03-31

## 2022-01-03 NOTE — PROGRESS NOTES
Subjective   Maria Del Carmen Esquivel is a 51 y.o. female. Presents to [facility]    Chief Complaint   Patient presents with   • Earache   • Urinary Tract Infection       Earache   There is pain in both ears. This is a new problem. The current episode started 1 to 4 weeks ago. The problem occurs constantly. The problem has been unchanged. There has been no fever. Pertinent negatives include no abdominal pain, coughing, diarrhea, ear discharge, rash, rhinorrhea, sore throat or vomiting.   Urinary Tract Infection   This is a new problem. The current episode started yesterday. The problem occurs every urination. The problem has been gradually worsening. The quality of the pain is described as burning. The pain is at a severity of 3/10. The pain is mild. There has been no fever. Associated symptoms include frequency and urgency. Pertinent negatives include no discharge, flank pain, hematuria, hesitancy, nausea, possible pregnancy or vomiting. She has tried nothing for the symptoms.        I personally reviewed and updated the patient's allergies, medications, problem list, and past medical, surgical, social, and family history. I have reviewed and confirmed the accuracy of the History of Present Illness and Review of Symptoms as documented by the MA/LPN/RN. Gloria Shore MD    Allergies:  Allergies   Allergen Reactions   • Beef-Derived Products Anaphylaxis   • Pork-Derived Products Anaphylaxis       Social History:  Social History     Socioeconomic History   • Marital status:    Tobacco Use   • Smoking status: Never Smoker   • Smokeless tobacco: Never Used   Substance and Sexual Activity   • Alcohol use: No   • Drug use: No       Family History:  Family History   Problem Relation Age of Onset   • Hyperlipidemia Mother    • Diabetes Mother    • Heart disease Father    • Heart attack Father    • Hyperlipidemia Father    • Diabetes Father    • Heart disease Maternal Uncle        Past Medical History  :  Patient Active Problem List   Diagnosis   • Abnormal stress test   • Adult situational stress disorder   • Allergic rhinitis due to pollen   • Arthritis   • Asymptomatic varicose veins of bilateral lower extremities   • Carpal tunnel syndrome   • DDD (degenerative disc disease), cervical   • Degenerative disc disease at L5-S1 level   • Family history of diabetes mellitus   • Fibromyalgia   • Generalized osteoarthritis   • GERD (gastroesophageal reflux disease)   • Herpes zoster without complication   • History of varicella   • Cervical radiculopathy   • Iron deficiency anemia   • Mild intermittent asthma without complication   • Overweight   • Plantar fasciitis, right   • Edema   • Discomfort of chest wall   • Dysuria       Medication List:    Current Outpatient Medications:   •  buPROPion XL (WELLBUTRIN XL) 150 MG 24 hr tablet, TAKE 1 TABLET BY MOUTH EVERY DAY, Disp: 90 tablet, Rfl: 2  •  busPIRone (BUSPAR) 10 MG tablet, Take 10 mg by mouth Daily., Disp: , Rfl:   •  cetirizine (zyrTEC) 10 MG tablet, Take 1 tablet by mouth Daily., Disp: 30 tablet, Rfl: 12  •  fluticasone (FLONASE) 50 MCG/ACT nasal spray, 2 sprays into the nostril(s) as directed by provider Daily., Disp: , Rfl:   •  Loratadine (Claritin) 10 MG capsule, Take  by mouth., Disp: , Rfl:   •  montelukast (SINGULAIR) 10 MG tablet, TAKE 1 TABLET BY MOUTH EVERY DAY AT NIGHT, Disp: 90 tablet, Rfl: 3  •  naproxen (NAPROSYN) 500 MG tablet, Take 500 mg by mouth As Needed., Disp: , Rfl:   •  albuterol sulfate  (90 Base) MCG/ACT inhaler, Inhale 2 puffs Every 4 (Four) Hours As Needed for Wheezing., Disp: 6.7 g, Rfl: 5  •  omeprazole (priLOSEC) 20 MG capsule, Take 1 capsule by mouth Daily., Disp: 30 capsule, Rfl: 3  •  sulfamethoxazole-trimethoprim (BACTRIM DS,SEPTRA DS) 800-160 MG per tablet, Take 1 tablet by mouth 2 (Two) Times a Day., Disp: 20 tablet, Rfl: 0    Past Surgical History:  Past Surgical History:   Procedure Laterality Date   • CHOLECYSTECTOMY  "    • LAPAROSCOPIC TUBAL LIGATION         Review of Systems:  Review of Systems   Constitutional: Negative for activity change and fever.   HENT: Positive for ear pain. Negative for ear discharge, rhinorrhea, sinus pressure, sore throat and voice change.    Eyes: Negative for visual disturbance.   Respiratory: Negative for cough and shortness of breath.    Cardiovascular: Negative for chest pain.   Gastrointestinal: Negative for abdominal pain, diarrhea, nausea and vomiting.   Endocrine: Negative for cold intolerance and heat intolerance.   Genitourinary: Positive for frequency and urgency. Negative for flank pain, hematuria and hesitancy.   Musculoskeletal: Negative for arthralgias.   Skin: Negative for rash.   Neurological: Negative for syncope.   Hematological: Does not bruise/bleed easily.   Psychiatric/Behavioral: Negative for depressed mood. The patient is not nervous/anxious.        Physical Exam:  Vital Signs:  Vital Signs:   /79   Pulse 83   Temp 98.2 °F (36.8 °C)   Resp 18   Ht 167.6 cm (66\")   Wt 122 kg (268 lb)   SpO2 98%   BMI 43.26 kg/m²     Result Review :                Physical Exam  Vitals reviewed.   Constitutional:       Appearance: She is well-developed.   HENT:      Head: Normocephalic and atraumatic.      Right Ear: Tympanic membrane and external ear normal. No decreased hearing noted. No tenderness. No middle ear effusion. Tympanic membrane is not injected, erythematous, retracted or bulging.      Left Ear: Tympanic membrane and external ear normal. No decreased hearing noted. No tenderness.  No middle ear effusion. Tympanic membrane is not injected, erythematous, retracted or bulging.      Nose: Nose normal. No rhinorrhea.      Mouth/Throat:      Pharynx: No oropharyngeal exudate or posterior oropharyngeal erythema.   Eyes:      General:         Right eye: No discharge.         Left eye: No discharge.   Cardiovascular:      Rate and Rhythm: Normal rate and regular rhythm.      " Heart sounds: Normal heart sounds. No murmur heard.  No friction rub. No gallop.    Pulmonary:      Effort: Pulmonary effort is normal. No respiratory distress.      Breath sounds: Normal breath sounds. No wheezing or rales.   Abdominal:      General: Abdomen is flat. Bowel sounds are normal. There is no distension.      Palpations: Abdomen is soft. There is no mass.      Tenderness: There is no abdominal tenderness. There is no guarding or rebound.      Hernia: No hernia is present.   Lymphadenopathy:      Cervical: No cervical adenopathy.   Skin:     General: Skin is warm and dry.      Findings: No rash.   Neurological:      Mental Status: She is alert and oriented to person, place, and time.         Assessment and Plan:  Problems Addressed this Visit        Gastrointestinal Abdominal     GERD (gastroesophageal reflux disease)     Limit tobacco, alcohol, caffeine, chocalate, citrus fruits, recumbency after meals and large portions. Discussed link between PPI's and increased risk of hip, wrist, and spine fractures         Relevant Medications    omeprazole (priLOSEC) 20 MG capsule       Genitourinary and Reproductive     Dysuria - Primary    Relevant Medications    sulfamethoxazole-trimethoprim (BACTRIM DS,SEPTRA DS) 800-160 MG per tablet    Other Relevant Orders    POC Urinalysis Dipstick, Multipro (Completed)    Urine Culture - Urine, Urine, Random Void (Completed)       Pulmonary and Pneumonias    Mild intermittent asthma without complication     Stable  Refilled albuterol to use as needed           Relevant Medications    albuterol sulfate  (90 Base) MCG/ACT inhaler      Other Visit Diagnoses     Acute ear pain, bilateral        Relevant Medications    sulfamethoxazole-trimethoprim (BACTRIM DS,SEPTRA DS) 800-160 MG per tablet      Diagnoses       Codes Comments    Dysuria    -  Primary ICD-10-CM: R30.0  ICD-9-CM: 788.1     Acute ear pain, bilateral     ICD-10-CM: H92.03  ICD-9-CM: 388.70     Mild  intermittent asthma without complication     ICD-10-CM: J45.20  ICD-9-CM: 493.90     Gastroesophageal reflux disease, unspecified whether esophagitis present     ICD-10-CM: K21.9  ICD-9-CM: 530.81            An After Visit Summary and PPPS were given to the patient.       I wore protective equipment throughout this patient encounter to include mask. Hand hygiene was performed before donning protective equipment and after removal when leaving the room.

## 2022-01-05 LAB
BACTERIA UR CULT: NO GROWTH
BACTERIA UR CULT: NORMAL

## 2022-01-06 DIAGNOSIS — R05.9 COUGH: ICD-10-CM

## 2022-01-06 RX ORDER — AZITHROMYCIN 250 MG/1
TABLET, FILM COATED ORAL
Qty: 6 TABLET | Refills: 0 | Status: SHIPPED | OUTPATIENT
Start: 2022-01-06 | End: 2022-03-31

## 2022-01-06 NOTE — ASSESSMENT & PLAN NOTE
Limit tobacco, alcohol, caffeine, chocalate, citrus fruits, recumbency after meals and large portions. Discussed link between PPI's and increased risk of hip, wrist, and spine fractures

## 2022-01-25 DIAGNOSIS — K21.9 GASTROESOPHAGEAL REFLUX DISEASE, UNSPECIFIED WHETHER ESOPHAGITIS PRESENT: ICD-10-CM

## 2022-01-25 RX ORDER — OMEPRAZOLE 20 MG/1
CAPSULE, DELAYED RELEASE ORAL
Qty: 30 CAPSULE | Refills: 3 | Status: SHIPPED | OUTPATIENT
Start: 2022-01-25 | End: 2022-03-31 | Stop reason: SDUPTHER

## 2022-03-31 ENCOUNTER — HOSPITAL ENCOUNTER (OUTPATIENT)
Dept: GENERAL RADIOLOGY | Facility: HOSPITAL | Age: 52
Discharge: HOME | End: 2022-03-31

## 2022-03-31 ENCOUNTER — OFFICE VISIT (OUTPATIENT)
Dept: FAMILY MEDICINE CLINIC | Facility: CLINIC | Age: 52
End: 2022-03-31

## 2022-03-31 VITALS
DIASTOLIC BLOOD PRESSURE: 82 MMHG | TEMPERATURE: 97.3 F | SYSTOLIC BLOOD PRESSURE: 120 MMHG | WEIGHT: 267.6 LBS | OXYGEN SATURATION: 98 % | RESPIRATION RATE: 18 BRPM | HEART RATE: 97 BPM | BODY MASS INDEX: 43.01 KG/M2 | HEIGHT: 66 IN

## 2022-03-31 DIAGNOSIS — J01.00 ACUTE NON-RECURRENT MAXILLARY SINUSITIS: ICD-10-CM

## 2022-03-31 DIAGNOSIS — M54.12 CERVICAL RADICULOPATHY: ICD-10-CM

## 2022-03-31 DIAGNOSIS — F41.9 ANXIETY: ICD-10-CM

## 2022-03-31 DIAGNOSIS — K21.9 GASTROESOPHAGEAL REFLUX DISEASE, UNSPECIFIED WHETHER ESOPHAGITIS PRESENT: ICD-10-CM

## 2022-03-31 DIAGNOSIS — M77.11 LATERAL EPICONDYLITIS OF RIGHT ELBOW: Primary | ICD-10-CM

## 2022-03-31 DIAGNOSIS — R07.89 OTHER CHEST PAIN: ICD-10-CM

## 2022-03-31 DIAGNOSIS — M54.2 NECK PAIN: ICD-10-CM

## 2022-03-31 PROCEDURE — 71046 X-RAY EXAM CHEST 2 VIEWS: CPT

## 2022-03-31 PROCEDURE — 99214 OFFICE O/P EST MOD 30 MIN: CPT | Performed by: FAMILY MEDICINE

## 2022-03-31 PROCEDURE — 73070 X-RAY EXAM OF ELBOW: CPT

## 2022-03-31 PROCEDURE — 93000 ELECTROCARDIOGRAM COMPLETE: CPT | Performed by: FAMILY MEDICINE

## 2022-03-31 RX ORDER — OMEPRAZOLE 20 MG/1
20 CAPSULE, DELAYED RELEASE ORAL DAILY
Qty: 30 CAPSULE | Refills: 5 | Status: SHIPPED | OUTPATIENT
Start: 2022-03-31 | End: 2022-11-02 | Stop reason: SDUPTHER

## 2022-03-31 RX ORDER — CEFDINIR 300 MG/1
300 CAPSULE ORAL 2 TIMES DAILY
Qty: 20 CAPSULE | Refills: 0 | Status: SHIPPED | OUTPATIENT
Start: 2022-03-31 | End: 2022-07-25

## 2022-03-31 RX ORDER — TRAZODONE HYDROCHLORIDE 50 MG/1
50 TABLET ORAL
COMMUNITY
Start: 2022-03-23 | End: 2023-02-09 | Stop reason: SDUPTHER

## 2022-03-31 NOTE — ASSESSMENT & PLAN NOTE
Discussed risks of steroids: hyperglycemia, osteoporosis, avascular necrosis, anxiety, insomnia and cataracts. Patient states understanding    She declines steroids

## 2022-03-31 NOTE — ASSESSMENT & PLAN NOTE
Most likely cause of chest pain and other issues. She is on wellbutrin. External issues. 3 grandbabies have had health issues.

## 2022-03-31 NOTE — PROGRESS NOTES
Subjective   Maria Del Carmen Esquivel is a 51 y.o. female. Presents to [facility]    Chief Complaint   Patient presents with   • Arm Pain   • URI         She had triplet grandbabies born 8 weeks ago. One developed pyloric stenosis. He went to the ER 5 times. While this stress was going on, she was walking to work, she  Went through hot air. She saw stars and had to lean against wall. She thinks it was stress and her asthma.     Arm Pain   The incident occurred more than 1 week ago. There was no injury mechanism. The pain is present in the right elbow and right forearm (patient does have tenditis in rt elbow). The quality of the pain is described as burning and aching. The pain radiates to the right neck. The pain has been fluctuating since the incident. Associated symptoms include muscle weakness, numbness and tingling. Pertinent negatives include no chest pain. The symptoms are aggravated by lifting (use of limb).   URI   This is a new problem. The current episode started in the past 7 days. The problem has been gradually worsening. There has been no fever. Associated symptoms include congestion, ear pain, headaches and sinus pain. Pertinent negatives include no abdominal pain, chest pain, coughing, diarrhea, nausea, rash, rhinorrhea or vomiting. She has tried decongestant for the symptoms. The treatment provided no relief.   Chest Pain   This is a new problem. The current episode started in the past 7 days. The onset quality is gradual. The problem occurs intermittently. The problem has been unchanged. The pain is present in the substernal region. The pain is moderate. The quality of the pain is described as dull. Radiates to: neck. Associated symptoms include headaches and numbness. Pertinent negatives include no abdominal pain, cough, fever, nausea, shortness of breath or vomiting. The pain is aggravated by food. She has tried antacids for the symptoms. The treatment provided significant relief. Past  medical history comments: gerd, asthma   Her family medical history is significant for CAD.      She baked all day for a shower. Her right elbow swelled. Better with out using it but its her right arm. A brace helped some.     I personally reviewed and updated the patient's allergies, medications, problem list, and past medical, surgical, social, and family history. I have reviewed and confirmed the accuracy of the History of Present Illness and Review of Symptoms as documented by the MA/LPN/RN. Gloria Shore MD    Allergies:  Allergies   Allergen Reactions   • Beef-Derived Products Anaphylaxis   • Pork-Derived Products Anaphylaxis       Social History:  Social History     Socioeconomic History   • Marital status:    Tobacco Use   • Smoking status: Never Smoker   • Smokeless tobacco: Never Used   Substance and Sexual Activity   • Alcohol use: No   • Drug use: No       Family History:  Family History   Problem Relation Age of Onset   • Hyperlipidemia Mother    • Diabetes Mother    • Heart disease Father    • Heart attack Father    • Hyperlipidemia Father    • Diabetes Father    • Heart disease Maternal Uncle        Past Medical History :  Patient Active Problem List   Diagnosis   • Abnormal stress test   • Adult situational stress disorder   • Allergic rhinitis due to pollen   • Arthritis   • Asymptomatic varicose veins of bilateral lower extremities   • Carpal tunnel syndrome   • DDD (degenerative disc disease), cervical   • Degenerative disc disease at L5-S1 level   • Family history of diabetes mellitus   • Fibromyalgia   • Generalized osteoarthritis   • GERD (gastroesophageal reflux disease)   • Herpes zoster without complication   • History of varicella   • Cervical radiculopathy   • Iron deficiency anemia   • Mild intermittent asthma without complication   • Overweight   • Plantar fasciitis, right   • Edema   • Dysuria   • Lateral epicondylitis of right elbow   • Other chest pain   • Anxiety   • Neck  pain       Medication List:    Current Outpatient Medications:   •  albuterol sulfate  (90 Base) MCG/ACT inhaler, Inhale 2 puffs Every 4 (Four) Hours As Needed for Wheezing., Disp: 6.7 g, Rfl: 5  •  buPROPion XL (WELLBUTRIN XL) 150 MG 24 hr tablet, TAKE 1 TABLET BY MOUTH EVERY DAY, Disp: 90 tablet, Rfl: 2  •  busPIRone (BUSPAR) 10 MG tablet, Take 10 mg by mouth Daily., Disp: , Rfl:   •  cefdinir (OMNICEF) 300 MG capsule, Take 1 capsule by mouth 2 (Two) Times a Day. May turn stool orange, Disp: 20 capsule, Rfl: 0  •  fluticasone (FLONASE) 50 MCG/ACT nasal spray, 2 sprays into the nostril(s) as directed by provider Daily., Disp: , Rfl:   •  Loratadine 10 MG capsule, Take  by mouth., Disp: , Rfl:   •  montelukast (SINGULAIR) 10 MG tablet, TAKE 1 TABLET BY MOUTH EVERY DAY AT NIGHT, Disp: 90 tablet, Rfl: 3  •  naproxen (NAPROSYN) 500 MG tablet, Take 500 mg by mouth As Needed., Disp: , Rfl:   •  omeprazole (priLOSEC) 20 MG capsule, Take 1 capsule by mouth Daily., Disp: 30 capsule, Rfl: 5  •  traZODone (DESYREL) 50 MG tablet, Take 50 mg by mouth every night at bedtime., Disp: , Rfl:     Past Surgical History:  Past Surgical History:   Procedure Laterality Date   • CHOLECYSTECTOMY     • LAPAROSCOPIC TUBAL LIGATION         Review of Systems:  Review of Systems   Constitutional: Negative for activity change and fever.   HENT: Positive for congestion and ear pain. Negative for rhinorrhea, sinus pressure and voice change.    Eyes: Negative for visual disturbance.   Respiratory: Negative for cough and shortness of breath.    Cardiovascular: Negative for chest pain.   Gastrointestinal: Negative for abdominal pain, diarrhea, nausea and vomiting.   Endocrine: Negative for cold intolerance and heat intolerance.   Genitourinary: Negative for frequency and urgency.   Musculoskeletal: Negative for arthralgias.   Skin: Negative for rash.   Neurological: Positive for tingling and numbness. Negative for syncope.   Hematological:  "Does not bruise/bleed easily.   Psychiatric/Behavioral: Negative for depressed mood. The patient is not nervous/anxious.        Physical Exam:  Vital Signs:  Vital Signs:   /82   Pulse 97   Temp 97.3 °F (36.3 °C)   Resp 18   Ht 167.6 cm (66\")   Wt 121 kg (267 lb 9.6 oz)   SpO2 98%   BMI 43.19 kg/m²     Result Review :   The following data was reviewed by: Gloria Shore MD on 03/31/2022:    Data reviewed: labs from [facility]: troponin is negative.      ECG 12 Lead    Date/Time: 3/31/2022 11:06 AM  Performed by: Gloria Shore MD  Authorized by: Gloria Shore MD   Comparison: not compared with previous ECG   Rhythm: sinus rhythm  Rate: normal  Conduction: conduction normal  ST Segments: ST segments normal  T Waves: T waves normal  QRS axis: normal  Other: no other findings    Clinical impression: normal ECG         XR Chest 2 View    Result Date: 3/31/2022  No acute cardiopulmonary abnormality.  Electronically Signed By-Jose Russ MD On:3/31/2022 12:01 PM This report was finalized on [id number] by  Jose Russ MD.    XR Elbow 2 View Right    Result Date: 3/31/2022  No acute osseous abnormality or significant degenerative change.  Electronically Signed By-Jose Russ MD On:3/31/2022 12:01 PM This report was finalized on [id number] by  Jose Russ MD.        Physical Exam  Vitals reviewed.   Constitutional:       Appearance: Normal appearance. She is well-developed.   HENT:      Head: Normocephalic and atraumatic.      Right Ear: Tympanic membrane and external ear normal. No decreased hearing noted. No tenderness. No middle ear effusion. Tympanic membrane is not erythematous or bulging.      Left Ear: Tympanic membrane and external ear normal. No decreased hearing noted. No tenderness.  No middle ear effusion. Tympanic membrane is not erythematous or bulging.      Nose: Nose normal.      Mouth/Throat:      Mouth: Mucous membranes are moist.      Pharynx: No oropharyngeal exudate or " posterior oropharyngeal erythema.   Eyes:      General:         Right eye: No discharge.         Left eye: No discharge.   Cardiovascular:      Rate and Rhythm: Normal rate and regular rhythm.      Heart sounds: Normal heart sounds. No murmur heard.    No friction rub. No gallop.   Pulmonary:      Effort: Pulmonary effort is normal. No respiratory distress.      Breath sounds: Normal breath sounds. No wheezing or rales.   Musculoskeletal:      Right elbow: Normal range of motion. Tenderness present in lateral epicondyle.   Skin:     General: Skin is warm and dry.      Findings: No rash.   Neurological:      Mental Status: She is alert and oriented to person, place, and time.      Coordination: Coordination normal.      Gait: Gait normal.   Psychiatric:         Behavior: Behavior is cooperative.         Assessment and Plan:  Problems Addressed this Visit        Cardiac and Vasculature    Other chest pain     Negative for acute cardiac syndrome  From gerd           Relevant Orders    XR Chest 2 View (Completed)    CK Total & CKMB (Completed)    Troponin T (Completed)    ECG 12 Lead       Gastrointestinal Abdominal     GERD (gastroesophageal reflux disease)     Limit tobacco, alcohol, caffeine, chocalate, citrus fruits, recumbency after meals and large portions. Discussed link between PPI's and increased risk of hip, wrist, and spine fractures             Relevant Medications    omeprazole (priLOSEC) 20 MG capsule       Mental Health    Anxiety     Most likely cause of chest pain and other issues. She is on wellbutrin. External issues. 3 grandbabies have had health issues.               Musculoskeletal and Injuries    Lateral epicondylitis of right elbow - Primary     Discussed risks of steroids: hyperglycemia, osteoporosis, avascular necrosis, anxiety, insomnia and cataracts. Patient states understanding    She declines steroids             Relevant Orders    XR Elbow 2 View Right (Completed)    Neck pain       Neuro     Cervical radiculopathy     Now and then  She wants to wait on studies and pain management             Other Visit Diagnoses     Acute non-recurrent maxillary sinusitis        Relevant Medications    cefdinir (OMNICEF) 300 MG capsule      Diagnoses       Codes Comments    Lateral epicondylitis of right elbow    -  Primary ICD-10-CM: M77.11  ICD-9-CM: 726.32     Other chest pain     ICD-10-CM: R07.89  ICD-9-CM: 786.59     Anxiety     ICD-10-CM: F41.9  ICD-9-CM: 300.00     Gastroesophageal reflux disease, unspecified whether esophagitis present     ICD-10-CM: K21.9  ICD-9-CM: 530.81     Neck pain     ICD-10-CM: M54.2  ICD-9-CM: 723.1     Cervical radiculopathy     ICD-10-CM: M54.12  ICD-9-CM: 723.4     Acute non-recurrent maxillary sinusitis     ICD-10-CM: J01.00  ICD-9-CM: 461.0            An After Visit Summary and PPPS were given to the patient.       I wore protective equipment throughout this patient encounter to include mask. Hand hygiene was performed before donning protective equipment and after removal when leaving the room.

## 2022-04-19 ENCOUNTER — TELEPHONE (OUTPATIENT)
Dept: FAMILY MEDICINE CLINIC | Facility: CLINIC | Age: 52
End: 2022-04-19

## 2022-04-19 NOTE — TELEPHONE ENCOUNTER
----- Message from Maria Del Carmen Esquivel sent at 4/19/2022 10:21 AM EDT -----  Regarding: Right elbow pain  I’ve been wearing a brace with no relief, I am now having increased neck pain, right shoulder pain and tingling, right arm, wrist and hand pain. Please advise

## 2022-04-21 ENCOUNTER — TELEPHONE (OUTPATIENT)
Dept: FAMILY MEDICINE CLINIC | Facility: CLINIC | Age: 52
End: 2022-04-21

## 2022-04-21 NOTE — TELEPHONE ENCOUNTER
----- Message from Maria Del Carmen Esquivel sent at 4/19/2022  5:41 PM EDT -----  Regarding: Right elbow pain  Are you making the appt or do I need to? I’ll take the soonest available

## 2022-05-23 ENCOUNTER — TELEPHONE (OUTPATIENT)
Dept: FAMILY MEDICINE CLINIC | Facility: CLINIC | Age: 52
End: 2022-05-23

## 2022-05-23 NOTE — TELEPHONE ENCOUNTER
----- Message from Maria Del Carmen Esquivel sent at 5/23/2022  4:31 PM EDT -----  Regarding: Neuro surgeon appointment   I’m still having pain in my right arm, I found a neuro surgeon but he requires a referral from your office. If you could send a referral and I’ll take the earliest appointment available.   It’s Dr Mohinder Hodgson   [phone]  Thank you, Maria Del Carmen

## 2022-07-19 ENCOUNTER — TRANSCRIBE ORDERS (OUTPATIENT)
Dept: ADMINISTRATIVE | Facility: HOSPITAL | Age: 52
End: 2022-07-19

## 2022-07-19 DIAGNOSIS — R29.2 ABNORMAL REFLEX: ICD-10-CM

## 2022-07-19 DIAGNOSIS — R39.15 URGENCY OF URINATION: ICD-10-CM

## 2022-07-19 DIAGNOSIS — G83.21: Primary | ICD-10-CM

## 2022-07-19 DIAGNOSIS — R27.8 MUSCULAR INCOORDINATION: ICD-10-CM

## 2022-07-25 ENCOUNTER — OFFICE VISIT (OUTPATIENT)
Dept: FAMILY MEDICINE CLINIC | Facility: CLINIC | Age: 52
End: 2022-07-25

## 2022-07-25 VITALS
WEIGHT: 262 LBS | HEIGHT: 66 IN | BODY MASS INDEX: 42.11 KG/M2 | RESPIRATION RATE: 18 BRPM | HEART RATE: 99 BPM | DIASTOLIC BLOOD PRESSURE: 86 MMHG | OXYGEN SATURATION: 99 % | SYSTOLIC BLOOD PRESSURE: 138 MMHG | TEMPERATURE: 98.4 F

## 2022-07-25 DIAGNOSIS — R06.02 SOB (SHORTNESS OF BREATH): ICD-10-CM

## 2022-07-25 DIAGNOSIS — R53.81 MALAISE AND FATIGUE: ICD-10-CM

## 2022-07-25 DIAGNOSIS — R53.83 MALAISE AND FATIGUE: ICD-10-CM

## 2022-07-25 DIAGNOSIS — J06.9 ACUTE URI: Primary | ICD-10-CM

## 2022-07-25 DIAGNOSIS — R00.2 PALPITATIONS: ICD-10-CM

## 2022-07-25 DIAGNOSIS — R42 DIZZINESS: ICD-10-CM

## 2022-07-25 DIAGNOSIS — R07.9 CHEST PAIN, UNSPECIFIED TYPE: ICD-10-CM

## 2022-07-25 DIAGNOSIS — E66.01 CLASS 3 SEVERE OBESITY DUE TO EXCESS CALORIES WITH SERIOUS COMORBIDITY AND BODY MASS INDEX (BMI) OF 40.0 TO 44.9 IN ADULT: ICD-10-CM

## 2022-07-25 PROCEDURE — 99214 OFFICE O/P EST MOD 30 MIN: CPT | Performed by: FAMILY MEDICINE

## 2022-07-25 RX ORDER — PREDNISONE 1 MG/1
TABLET ORAL
Qty: 45 TABLET | Refills: 0 | Status: SHIPPED | OUTPATIENT
Start: 2022-07-25 | End: 2022-08-05

## 2022-07-25 RX ORDER — IBUPROFEN 800 MG/1
TABLET ORAL
COMMUNITY
Start: 2022-07-06 | End: 2022-08-05

## 2022-07-25 RX ORDER — FAMOTIDINE 10 MG
TABLET ORAL
COMMUNITY
End: 2022-11-02

## 2022-07-25 RX ORDER — MECLIZINE HYDROCHLORIDE 25 MG/1
25 TABLET ORAL 4 TIMES DAILY
Qty: 90 TABLET | Refills: 1 | Status: SHIPPED | OUTPATIENT
Start: 2022-07-25 | End: 2022-08-18

## 2022-07-25 RX ORDER — CEPHALEXIN 500 MG/1
500 CAPSULE ORAL 3 TIMES DAILY
Qty: 30 CAPSULE | Refills: 0 | Status: SHIPPED | OUTPATIENT
Start: 2022-07-25 | End: 2022-08-05

## 2022-07-25 NOTE — PROGRESS NOTES
Subjective   Maria Del Carmen Esquivel is a 52 y.o. female.     Chief Complaint   Patient presents with   • URI   • Palpitations   • Shortness of Breath       URI   This is a new problem. The current episode started in the past 7 days. The problem has been gradually worsening. There has been no fever. Associated symptoms include abdominal pain, nausea and rhinorrhea. Pertinent negatives include no chest pain, congestion, coughing or vomiting. Associated symptoms comments: Shortness of breath. Treatments tried: Albuterol.            I personally reviewed and updated the patient's allergies, medications, problem list, and past medical, surgical, social, and family history. I have reviewed and confirmed the accuracy of the History of Present Illness and Review of Symptoms as documented by the MA/LPN/RN. Scotty George MD    Family History   Problem Relation Age of Onset   • Hyperlipidemia Mother    • Diabetes Mother    • Heart disease Father    • Heart attack Father    • Hyperlipidemia Father    • Diabetes Father    • Heart disease Maternal Uncle        Social History     Tobacco Use   • Smoking status: Never Smoker   • Smokeless tobacco: Never Used   Vaping Use   • Vaping Use: Never used   Substance Use Topics   • Alcohol use: No   • Drug use: No       Past Surgical History:   Procedure Laterality Date   • CHOLECYSTECTOMY     • LAPAROSCOPIC TUBAL LIGATION         Patient Active Problem List   Diagnosis   • Abnormal stress test   • Adult situational stress disorder   • Allergic rhinitis due to pollen   • Arthritis   • Asymptomatic varicose veins of bilateral lower extremities   • Carpal tunnel syndrome   • DDD (degenerative disc disease), cervical   • Degenerative disc disease at L5-S1 level   • Family history of diabetes mellitus   • Fibromyalgia   • Generalized osteoarthritis   • GERD (gastroesophageal reflux disease)   • Herpes zoster without complication   • History of varicella   • Cervical radiculopathy   • Iron  deficiency anemia   • Mild intermittent asthma without complication   • Class 3 severe obesity due to excess calories with serious comorbidity and body mass index (BMI) of 40.0 to 44.9 in adult (HCC)   • Plantar fasciitis, right   • Edema   • Dysuria   • Lateral epicondylitis of right elbow   • Other chest pain   • Anxiety   • Neck pain         Current Outpatient Medications:   •  albuterol sulfate  (90 Base) MCG/ACT inhaler, Inhale 2 puffs Every 4 (Four) Hours As Needed for Wheezing., Disp: 6.7 g, Rfl: 5  •  buPROPion XL (WELLBUTRIN XL) 150 MG 24 hr tablet, TAKE 1 TABLET BY MOUTH EVERY DAY, Disp: 90 tablet, Rfl: 2  •  busPIRone (BUSPAR) 10 MG tablet, Take 10 mg by mouth Daily., Disp: , Rfl:   •  famotidine (PEPCID) 10 MG tablet, Take  by mouth., Disp: , Rfl:   •  fluticasone (FLONASE) 50 MCG/ACT nasal spray, 2 sprays into the nostril(s) as directed by provider Daily., Disp: , Rfl:   •  ibuprofen (ADVIL,MOTRIN) 800 MG tablet, TAKE 1 TABLET (800 MG TOTAL) BY MOUTH EVERY 8 (EIGHT) HOURS IF NEEDED FOR MILD PAIN, Disp: , Rfl:   •  Loratadine 10 MG capsule, Take  by mouth., Disp: , Rfl:   •  montelukast (SINGULAIR) 10 MG tablet, TAKE 1 TABLET BY MOUTH EVERY DAY AT NIGHT, Disp: 90 tablet, Rfl: 3  •  omeprazole (priLOSEC) 20 MG capsule, Take 1 capsule by mouth Daily., Disp: 30 capsule, Rfl: 5  •  traZODone (DESYREL) 50 MG tablet, Take 50 mg by mouth every night at bedtime., Disp: , Rfl:   •  cephalexin (KEFLEX) 500 MG capsule, Take 1 capsule by mouth 3 (Three) Times a Day., Disp: 30 capsule, Rfl: 0  •  meclizine (ANTIVERT) 25 MG tablet, Take 1 tablet by mouth 4 (Four) Times a Day., Disp: 90 tablet, Rfl: 1  •  naproxen (NAPROSYN) 500 MG tablet, Take 500 mg by mouth As Needed., Disp: , Rfl:   •  predniSONE (DELTASONE) 5 MG tablet, 40mg x 3 days, 20mg x 3 days, 10mg x 3 days, 5mg x 3 days, Disp: 45 tablet, Rfl: 0         Review of Systems   Constitutional: Negative for chills and diaphoresis.   HENT: Positive for  "rhinorrhea. Negative for congestion.    Eyes: Negative for visual disturbance.   Respiratory: Negative for cough and shortness of breath.    Cardiovascular: Negative for chest pain and palpitations.   Gastrointestinal: Positive for abdominal pain and nausea. Negative for vomiting.   Endocrine: Negative for polydipsia and polyphagia.   Musculoskeletal: Negative for neck stiffness.   Skin: Negative for color change and pallor.   Neurological: Negative for seizures and syncope.   Hematological: Negative for adenopathy.   Psychiatric/Behavioral: Negative for hallucinations and suicidal ideas.       I have reviewed and confirmed the accuracy of the ROS as documented by the MA/LPN/RN Scotty George MD      Objective   /86   Pulse 99   Temp 98.4 °F (36.9 °C)   Resp 18   Ht 167.6 cm (66\")   Wt 119 kg (262 lb)   SpO2 99%   Breastfeeding No   BMI 42.29 kg/m²   BP Readings from Last 3 Encounters:   07/25/22 138/86   03/31/22 120/82   01/03/22 119/79     Wt Readings from Last 3 Encounters:   07/25/22 119 kg (262 lb)   03/31/22 121 kg (267 lb 9.6 oz)   01/03/22 122 kg (268 lb)     Physical Exam  Constitutional:       Appearance: Normal appearance. She is well-developed. She is not diaphoretic.   HENT:      Head: Normocephalic.      Right Ear: Hearing, ear canal and external ear normal. A middle ear effusion is present. Tympanic membrane is erythematous.      Left Ear: Hearing, ear canal and external ear normal. A middle ear effusion is present. Tympanic membrane is erythematous.      Nose: Congestion present. No mucosal edema.      Right Sinus: Maxillary sinus tenderness and frontal sinus tenderness present.      Left Sinus: Maxillary sinus tenderness and frontal sinus tenderness present.      Mouth/Throat:      Mouth: No oral lesions.      Pharynx: Uvula midline. Posterior oropharyngeal erythema present. No oropharyngeal exudate.      Tonsils: No tonsillar exudate or tonsillar abscesses. 1+ on the right. 1+ on the " left.   Eyes:      General: Lids are normal.      Conjunctiva/sclera: Conjunctivae normal.      Pupils: Pupils are equal, round, and reactive to light.   Neck:      Meningeal: Brudzinski's sign and Kernig's sign absent.   Cardiovascular:      Rate and Rhythm: Normal rate and regular rhythm.      Pulses: Normal pulses.      Heart sounds: Normal heart sounds, S1 normal and S2 normal. No murmur heard.    No friction rub. No gallop.   Pulmonary:      Effort: Pulmonary effort is normal. No accessory muscle usage or respiratory distress.      Breath sounds: Normal breath sounds. No stridor. No decreased breath sounds, wheezing, rhonchi or rales.   Abdominal:      General: Bowel sounds are normal. There is no distension.      Palpations: Abdomen is soft. Abdomen is not rigid. There is no mass or pulsatile mass.      Tenderness: There is no abdominal tenderness. There is no guarding or rebound. Negative signs include Meza's sign.      Hernia: No hernia is present.   Skin:     General: Skin is warm and dry.      Coloration: Skin is not pale.   Neurological:      Mental Status: She is alert and oriented to person, place, and time.      Cranial Nerves: No cranial nerve deficit.      Sensory: No sensory deficit.      Motor: No tremor, abnormal muscle tone or seizure activity.      Coordination: Coordination normal.      Gait: Gait normal.      Deep Tendon Reflexes: Reflexes are normal and symmetric.      Comments: Claflin-Hallpike positive to right.         Data / Lab Results:    No results found for: HGBA1C     No results found for: LDL, LDLDIRECT  No results found for: CHOL  No results found for: TRIG  No results found for: HDL  No results found for: PSA  No results found for: WBC, HGB, HCT, MCV, PLT  No results found for: TSH, P7RCQEY, L8ZDEAC   Lab Results   Component Value Date    BUN 14 05/02/2016    CREATININE 0.70 05/02/2016    EGFRIFNONA 103 05/02/2016    EGFRIFAFRI 120 05/02/2016    BCR NOT APPLICABLE 05/02/2016    K  3.9 05/02/2016    CO2 24 05/02/2016    CALCIUM 9.3 05/02/2016    ALBUMIN 4.0 05/02/2016    AST 18 05/02/2016    ALT 27 05/02/2016     Lab Results   Component Value Date    PEÑA NEGATIVE 09/25/2017    SEDRATE 25 (H) 09/25/2017      No results found for: CRP   No results found for: IRON, TIBC, FERRITIN   No results found for: IFRSEMIE23       Assessment & Plan      Medications        Problem List         LOS    Vertigo.  Overall benign exam today/consistent with BPPV, start Pulliam-Daroff exercises.  Positive significant sinus effusion start antibiotics/prednisone.  DDx includes vestibular neuritis.  Increase fluid intake, rest, off work.  Follow-up recheck.  Consider imaging if persistent symptoms.  Palpitations.  Check blood work/Holter.  EKG/troponins benign 3/22.  Cardiology referral scheduled later this week.  Call/go to the ED if chest pain on exertion or worsening symptoms.  Limit caffeine intake.  Atypical chest pain.  Somewhat improved with PPI.  Holter placed.  Consider stress testing/cardiology referral scheduled.  Remote history of cardiology work-up per Dr. Hollingsworth, will get records.      Diagnoses and all orders for this visit:    1. Acute URI (Primary)  -     cephalexin (KEFLEX) 500 MG capsule; Take 1 capsule by mouth 3 (Three) Times a Day.  Dispense: 30 capsule; Refill: 0  -     predniSONE (DELTASONE) 5 MG tablet; 40mg x 3 days, 20mg x 3 days, 10mg x 3 days, 5mg x 3 days  Dispense: 45 tablet; Refill: 0  -     meclizine (ANTIVERT) 25 MG tablet; Take 1 tablet by mouth 4 (Four) Times a Day.  Dispense: 90 tablet; Refill: 1    2. Class 3 severe obesity due to excess calories with serious comorbidity and body mass index (BMI) of 40.0 to 44.9 in adult (HCC)  Assessment & Plan:  Patient's (Body mass index is 42.29 kg/m².) indicates that they are morbidly obese (BMI > 40 or > 35 with obesity - related health condition) with health conditions that include GERD . Weight is unchanged. BMI is is above average; BMI  management plan is completed. We discussed portion control and increasing exercise.       3. Palpitations  -     CBC & Differential  -     Comprehensive Metabolic Panel  -     TSH  -     T4, Free  -     Calcium, Ionized  -     Holter Monitor - 24 Hour    4. Malaise and fatigue  -     CBC & Differential  -     Comprehensive Metabolic Panel  -     TSH  -     T4, Free  -     Calcium, Ionized    5. Chest pain, unspecified type  -     CBC & Differential  -     Comprehensive Metabolic Panel  -     TSH  -     T4, Free  -     Calcium, Ionized  -     Holter Monitor - 24 Hour    6. Dizziness  -     CBC & Differential  -     Comprehensive Metabolic Panel  -     TSH  -     T4, Free  -     Calcium, Ionized  -     predniSONE (DELTASONE) 5 MG tablet; 40mg x 3 days, 20mg x 3 days, 10mg x 3 days, 5mg x 3 days  Dispense: 45 tablet; Refill: 0  -     meclizine (ANTIVERT) 25 MG tablet; Take 1 tablet by mouth 4 (Four) Times a Day.  Dispense: 90 tablet; Refill: 1  -     Holter Monitor - 24 Hour    7. SOB (shortness of breath)  -     Holter Monitor - 24 Hour            Expected course, medications, and adverse effects discussed.  Call or return if worsening or persistent symptoms.  I wore protective equipment throughout this patient encounter including a mask, gloves, and eye protection.  Hand hygiene was performed before donning protective equipment and after removal when leaving the room. The complete contents of the Assessment and Plan and Data/Lab Results as documented above have been reviewed and addressed by myself with the patient today as part of an ongoing evaluation / treatment plan.  If some of the documentation has been copied from a previous note and is unchanged it indicates that this problem / plan has been assessed today but is stable from a previous visit and no changes have been recommended.

## 2022-07-25 NOTE — ASSESSMENT & PLAN NOTE
Patient's (Body mass index is 42.29 kg/m².) indicates that they are morbidly obese (BMI > 40 or > 35 with obesity - related health condition) with health conditions that include GERD . Weight is unchanged. BMI is is above average; BMI management plan is completed. We discussed portion control and increasing exercise.

## 2022-07-26 ENCOUNTER — CLINICAL SUPPORT (OUTPATIENT)
Dept: FAMILY MEDICINE CLINIC | Facility: CLINIC | Age: 52
End: 2022-07-26

## 2022-07-26 ENCOUNTER — APPOINTMENT (OUTPATIENT)
Dept: MRI IMAGING | Facility: HOSPITAL | Age: 52
End: 2022-07-26

## 2022-07-26 LAB
ALBUMIN SERPL-MCNC: 4.6 G/DL (ref 3.8–4.9)
ALBUMIN/GLOB SERPL: 1.4 {RATIO} (ref 1.2–2.2)
ALP SERPL-CCNC: 93 IU/L (ref 44–121)
ALT SERPL-CCNC: 25 IU/L (ref 0–32)
AST SERPL-CCNC: 17 IU/L (ref 0–40)
BASOPHILS # BLD AUTO: 0.1 X10E3/UL (ref 0–0.2)
BASOPHILS NFR BLD AUTO: 1 %
BILIRUB SERPL-MCNC: 0.3 MG/DL (ref 0–1.2)
BUN SERPL-MCNC: 22 MG/DL (ref 6–24)
BUN/CREAT SERPL: 18 (ref 9–23)
CA-I SERPL ISE-MCNC: 5.2 MG/DL (ref 4.5–5.6)
CALCIUM SERPL-MCNC: 10.2 MG/DL (ref 8.7–10.2)
CHLORIDE SERPL-SCNC: 100 MMOL/L (ref 96–106)
CO2 SERPL-SCNC: 25 MMOL/L (ref 20–29)
CREAT SERPL-MCNC: 1.24 MG/DL (ref 0.57–1)
EGFRCR SERPLBLD CKD-EPI 2021: 52 ML/MIN/1.73
EOSINOPHIL # BLD AUTO: 0.3 X10E3/UL (ref 0–0.4)
EOSINOPHIL NFR BLD AUTO: 4 %
ERYTHROCYTE [DISTWIDTH] IN BLOOD BY AUTOMATED COUNT: 13.4 % (ref 11.7–15.4)
GLOBULIN SER CALC-MCNC: 3.2 G/DL (ref 1.5–4.5)
GLUCOSE SERPL-MCNC: 107 MG/DL (ref 65–99)
HCT VFR BLD AUTO: 40.5 % (ref 34–46.6)
HGB BLD-MCNC: 13.3 G/DL (ref 11.1–15.9)
IMM GRANULOCYTES # BLD AUTO: 0 X10E3/UL (ref 0–0.1)
IMM GRANULOCYTES NFR BLD AUTO: 1 %
LYMPHOCYTES # BLD AUTO: 1.6 X10E3/UL (ref 0.7–3.1)
LYMPHOCYTES NFR BLD AUTO: 27 %
MCH RBC QN AUTO: 28.9 PG (ref 26.6–33)
MCHC RBC AUTO-ENTMCNC: 32.8 G/DL (ref 31.5–35.7)
MCV RBC AUTO: 88 FL (ref 79–97)
MONOCYTES # BLD AUTO: 0.5 X10E3/UL (ref 0.1–0.9)
MONOCYTES NFR BLD AUTO: 8 %
NEUTROPHILS # BLD AUTO: 3.5 X10E3/UL (ref 1.4–7)
NEUTROPHILS NFR BLD AUTO: 59 %
PLATELET # BLD AUTO: 265 X10E3/UL (ref 150–450)
POTASSIUM SERPL-SCNC: 4.9 MMOL/L (ref 3.5–5.2)
PROT SERPL-MCNC: 7.8 G/DL (ref 6–8.5)
RBC # BLD AUTO: 4.6 X10E6/UL (ref 3.77–5.28)
SODIUM SERPL-SCNC: 137 MMOL/L (ref 134–144)
T4 FREE SERPL-MCNC: 1.12 NG/DL (ref 0.82–1.77)
TSH SERPL DL<=0.005 MIU/L-ACNC: 0.73 UIU/ML (ref 0.45–4.5)
WBC # BLD AUTO: 5.9 X10E3/UL (ref 3.4–10.8)

## 2022-07-28 NOTE — PROGRESS NOTES
Date of Office Visit: 2022  Encounter Provider: Dr. Eric Diggs  Place of Service: [facility] CARDIOLOGY [place]  Patient Name: Maria Del Carmen Esquivel  :1970  Gloria Shore MD    Chief Complaint   Patient presents with   • Chest Pain   • Consult     History of Present Illness:    I am pleased to see Mrs. Esquivel in my office today as a follow-up.    As you know, patient is 52-year-old white female whose past medical history significant for asthma, anxiety disorder, mild obesity, who is referred to me for symptom of palpitation and shortness of breath.    In 2022, patient developed symptom of palpitation.  She described this as a racing of the heart.  She was at her work initially when she had that episode.  At that time vital signs showed heart rate of 86 bpm and blood pressure of 144/70 mmHg.  To the next few days patient had heart rate of 148 bpm.  Patient complain of dizziness.  Patient also complain of chest pain.  Patient complain of shortness of breath on exertion.  Patient has exertional complaint of palpitation.  Patient has not had any syncope or presyncope.  No orthopnea or PND no significant leg edema.    Patient does not have previous history of CAD, PCI or MI.  Patient does not smoke or abuse alcohol.    Holter monitor showed normal sinus rhythm.  Frequent PVCs were reported by computer but I reviewed tracing myself.  Patient does have PVCs but there was no ventricular tachycardia but significant burden of baseline artifact is noted.  Holter monitor with good tracing did not show any VT.    I would recommend that patient should proceed with event monitor.  I also recommend to proceed with echocardiogram and stress test.  Patient is advised to monitor her sleep.  I suspect patient may have underlying sleep apnea.  Consider low-dose beta-blocker in future.  Patient is advised to monitor the blood pressure and bring the log book.        Past Medical History:   Diagnosis Date   •  Abnormal stress test 9/23/2019    Per patient report.  Will refer to cardiology.   • Adult situational stress disorder 9/23/2019    Increased a little because of work stress. Discussed relaxation and coping. Good social support, no suicidal or homicidal ideation. Diagnosis and treatment discussed. Discussed counseling. Discussed medication, dosing and adverse effects.   • Allergic rhinitis due to pollen 9/23/2019   • Anxiety 9/23/2019    Good social support, no suicidal or homicidal ideation. Diagnosis and treatment discussed. Discussed counseling. Discussed medication, dosing and adverse effects. Return in 4 weeks   • Arthritis 9/23/2019    She is not interested in medications at this time, no interested in PT.  She reports a possible side effect to meloxicam (soft tissue bruising?).  No documented NSAID allergy, but she has reservations about using prescription NSAIDS.  Natural treatments discussed today.  Try glucosamine and chondroitin, tumeric and arnica gel.  I do not see rheum labs in her chart, so will proceed with those today   • Asymptomatic varicose veins of bilateral lower extremities 9/23/2019    bilateral, advised elastic stockings Elevation and followup should sxs worsen. Diet exercise and wt loss encouraged.   • Carpal tunnel syndrome 4/16/2014   • Cervical radiculopathy 4/16/2014   • DDD (degenerative disc disease), cervical 9/23/2019    As above.   • Family history of diabetes mellitus 9/23/2019   • Fibromyalgia 9/23/2019    discussed different treatments. Discussed exercise and sunlight. Discussed cymbalta as a treatment She is going to think about it   • Generalized osteoarthritis 9/23/2019   • GERD (gastroesophageal reflux disease) 9/23/2019    stable   • Herpes zoster without complication 9/23/2019    possible right flank, but no rash yet, just pain prescription given as below   • History of varicella 9/23/2019   • Iron deficiency anemia 9/23/2019   • Mild intermittent asthma with acute  exacerbation 9/23/2019    exacerbated, inhaler as below. Increase fluids. Tylenol/motrin for pain or fever. Medication and medication adverse effects discussed.  Follow-up 5-7 days for reevaluation if not improved or sooner if needed.   • Overweight 9/23/2019   • Plantar fasciitis, right 9/23/2019    discussed ice, stretching and NSAIDs   • Radiculopathy 9/23/2019    cervical type, hands on occasion         Past Surgical History:   Procedure Laterality Date   • CHOLECYSTECTOMY     • LAPAROSCOPIC TUBAL LIGATION             Current Outpatient Medications:   •  albuterol sulfate  (90 Base) MCG/ACT inhaler, Inhale 2 puffs Every 4 (Four) Hours As Needed for Wheezing., Disp: 6.7 g, Rfl: 5  •  buPROPion XL (WELLBUTRIN XL) 150 MG 24 hr tablet, TAKE 1 TABLET BY MOUTH EVERY DAY, Disp: 90 tablet, Rfl: 2  •  busPIRone (BUSPAR) 10 MG tablet, Take 10 mg by mouth Daily., Disp: , Rfl:   •  cephalexin (KEFLEX) 500 MG capsule, Take 1 capsule by mouth 3 (Three) Times a Day., Disp: 30 capsule, Rfl: 0  •  famotidine (PEPCID) 10 MG tablet, Take  by mouth., Disp: , Rfl:   •  fluticasone (FLONASE) 50 MCG/ACT nasal spray, 2 sprays into the nostril(s) as directed by provider Daily., Disp: , Rfl:   •  ibuprofen (ADVIL,MOTRIN) 800 MG tablet, TAKE 1 TABLET (800 MG TOTAL) BY MOUTH EVERY 8 (EIGHT) HOURS IF NEEDED FOR MILD PAIN, Disp: , Rfl:   •  Loratadine 10 MG capsule, Take  by mouth., Disp: , Rfl:   •  meclizine (ANTIVERT) 25 MG tablet, Take 1 tablet by mouth 4 (Four) Times a Day., Disp: 90 tablet, Rfl: 1  •  montelukast (SINGULAIR) 10 MG tablet, TAKE 1 TABLET BY MOUTH EVERY DAY AT NIGHT, Disp: 90 tablet, Rfl: 3  •  omeprazole (priLOSEC) 20 MG capsule, Take 1 capsule by mouth Daily., Disp: 30 capsule, Rfl: 5  •  predniSONE (DELTASONE) 5 MG tablet, 40mg x 3 days, 20mg x 3 days, 10mg x 3 days, 5mg x 3 days, Disp: 45 tablet, Rfl: 0  •  traZODone (DESYREL) 50 MG tablet, Take 50 mg by mouth every night at bedtime., Disp: , Rfl:       Social  "History     Socioeconomic History   • Marital status:    Tobacco Use   • Smoking status: Never Smoker   • Smokeless tobacco: Never Used   Vaping Use   • Vaping Use: Never used   Substance and Sexual Activity   • Alcohol use: No   • Drug use: No   • Sexual activity: Defer         Review of Systems   Constitutional: Negative for chills and fever.   HENT: Negative for ear discharge and nosebleeds.    Eyes: Negative for discharge and redness.   Cardiovascular: Positive for chest pain and palpitations. Negative for orthopnea, paroxysmal nocturnal dyspnea and syncope.   Respiratory: Positive for shortness of breath. Negative for cough and wheezing.    Endocrine: Negative for heat intolerance.   Skin: Negative for rash.   Musculoskeletal: Negative for arthritis and myalgias.   Gastrointestinal: Negative for abdominal pain, melena, nausea and vomiting.   Genitourinary: Negative for dysuria and hematuria.   Neurological: Negative for dizziness, light-headedness, numbness and tremors.   Psychiatric/Behavioral: Negative for depression. The patient is not nervous/anxious.        Procedures    Procedures    No orders to display           Objective:    /77 (BP Location: Right arm, Patient Position: Sitting, Cuff Size: Large Adult)   Pulse 82   Ht 167.6 cm (65.98\")   Wt 119 kg (262 lb)   BMI 42.31 kg/m²         Constitutional:       Appearance: Well-developed.   Eyes:      General: No scleral icterus.        Right eye: No discharge.   HENT:      Head: Normocephalic and atraumatic.   Neck:      Thyroid: No thyromegaly.      Lymphadenopathy: No cervical adenopathy.   Pulmonary:      Effort: Pulmonary effort is normal. No respiratory distress.      Breath sounds: Normal breath sounds. No wheezing. No rales.   Cardiovascular:      Normal rate. Regular rhythm.      No gallop.   Abdominal:      Tenderness: There is no abdominal tenderness.   Skin:     Findings: No erythema or rash.   Neurological:      Mental Status: " Alert and oriented to person, place, and time.             Assessment:       Diagnosis Plan   1. Other chest pain  Adult Transthoracic Echo Complete W/ Cont if Necessary Per Protocol    Stress Test With Myocardial Perfusion One Day    Cardiac Event Monitor   2. Palpitations  Adult Transthoracic Echo Complete W/ Cont if Necessary Per Protocol    Stress Test With Myocardial Perfusion One Day    Cardiac Event Monitor   3. Chest pain, precordial  Adult Transthoracic Echo Complete W/ Cont if Necessary Per Protocol    Stress Test With Myocardial Perfusion One Day    Cardiac Event Monitor   4. Dizziness  Adult Transthoracic Echo Complete W/ Cont if Necessary Per Protocol    Stress Test With Myocardial Perfusion One Day    Cardiac Event Monitor   5. Shortness of breath  Adult Transthoracic Echo Complete W/ Cont if Necessary Per Protocol    Stress Test With Myocardial Perfusion One Day    Cardiac Event Monitor            Plan:       MDM:    1.  Palpitation:    Proceed with event monitor.  Consider beta-blocker in future    2.  Shortness of breath:    Patient but underwent echocardiogram and stress test.    3.  Chest pain:    Patient is having atypical chest pain.  Stress test will be done to exclude ischemia    4.  Dizziness:    Etiology is unclear.  I advised the patient to monitor the blood pressure.

## 2022-07-29 ENCOUNTER — OFFICE VISIT (OUTPATIENT)
Dept: CARDIOLOGY | Facility: CLINIC | Age: 52
End: 2022-07-29

## 2022-07-29 VITALS
SYSTOLIC BLOOD PRESSURE: 114 MMHG | HEIGHT: 66 IN | HEART RATE: 82 BPM | DIASTOLIC BLOOD PRESSURE: 77 MMHG | BODY MASS INDEX: 42.11 KG/M2 | WEIGHT: 262 LBS

## 2022-07-29 DIAGNOSIS — R07.89 OTHER CHEST PAIN: Primary | ICD-10-CM

## 2022-07-29 DIAGNOSIS — R00.2 PALPITATIONS: ICD-10-CM

## 2022-07-29 DIAGNOSIS — R06.02 SHORTNESS OF BREATH: ICD-10-CM

## 2022-07-29 DIAGNOSIS — R42 DIZZINESS: ICD-10-CM

## 2022-07-29 DIAGNOSIS — R07.2 CHEST PAIN, PRECORDIAL: ICD-10-CM

## 2022-07-29 PROCEDURE — 99204 OFFICE O/P NEW MOD 45 MIN: CPT | Performed by: INTERNAL MEDICINE

## 2022-08-03 NOTE — PROGRESS NOTES
Subjective   Maria Del Carmen Esquivel is a 52 y.o. female. Presents to [facility]    No chief complaint on file.      Dizziness         I personally reviewed and updated the patient's allergies, medications, problem list, and past medical, surgical, social, and family history. I have reviewed and confirmed the accuracy of the History of Present Illness and Review of Symptoms as documented by the MA/LPN/RN. Gloria Shore MD    Allergies:  Allergies   Allergen Reactions   • Beef-Derived Products Anaphylaxis   • Pork-Derived Products Anaphylaxis       Social History:  Social History     Socioeconomic History   • Marital status:    Tobacco Use   • Smoking status: Never Smoker   • Smokeless tobacco: Never Used   Vaping Use   • Vaping Use: Never used   Substance and Sexual Activity   • Alcohol use: No   • Drug use: No   • Sexual activity: Defer       Family History:  Family History   Problem Relation Age of Onset   • Hyperlipidemia Mother    • Diabetes Mother    • Heart disease Father    • Heart attack Father    • Hyperlipidemia Father    • Diabetes Father    • Heart disease Maternal Uncle        Past Medical History :  Patient Active Problem List   Diagnosis   • Abnormal stress test   • Adult situational stress disorder   • Allergic rhinitis due to pollen   • Arthritis   • Asymptomatic varicose veins of bilateral lower extremities   • Carpal tunnel syndrome   • DDD (degenerative disc disease), cervical   • Degenerative disc disease at L5-S1 level   • Family history of diabetes mellitus   • Fibromyalgia   • Generalized osteoarthritis   • GERD (gastroesophageal reflux disease)   • Herpes zoster without complication   • History of varicella   • Cervical radiculopathy   • Iron deficiency anemia   • Mild intermittent asthma without complication   • Class 3 severe obesity due to excess calories with serious comorbidity and body mass index (BMI) of 40.0 to 44.9 in adult (HCC)   • Plantar fasciitis, right   •  Edema   • Dysuria   • Lateral epicondylitis of right elbow   • Other chest pain   • Anxiety   • Neck pain       Medication List:    Current Outpatient Medications:   •  albuterol sulfate  (90 Base) MCG/ACT inhaler, Inhale 2 puffs Every 4 (Four) Hours As Needed for Wheezing., Disp: 6.7 g, Rfl: 5  •  buPROPion XL (WELLBUTRIN XL) 150 MG 24 hr tablet, TAKE 1 TABLET BY MOUTH EVERY DAY, Disp: 90 tablet, Rfl: 2  •  busPIRone (BUSPAR) 10 MG tablet, Take 10 mg by mouth Daily., Disp: , Rfl:   •  cephalexin (KEFLEX) 500 MG capsule, Take 1 capsule by mouth 3 (Three) Times a Day., Disp: 30 capsule, Rfl: 0  •  famotidine (PEPCID) 10 MG tablet, Take  by mouth., Disp: , Rfl:   •  fluticasone (FLONASE) 50 MCG/ACT nasal spray, 2 sprays into the nostril(s) as directed by provider Daily., Disp: , Rfl:   •  ibuprofen (ADVIL,MOTRIN) 800 MG tablet, TAKE 1 TABLET (800 MG TOTAL) BY MOUTH EVERY 8 (EIGHT) HOURS IF NEEDED FOR MILD PAIN, Disp: , Rfl:   •  Loratadine 10 MG capsule, Take  by mouth., Disp: , Rfl:   •  meclizine (ANTIVERT) 25 MG tablet, Take 1 tablet by mouth 4 (Four) Times a Day., Disp: 90 tablet, Rfl: 1  •  montelukast (SINGULAIR) 10 MG tablet, TAKE 1 TABLET BY MOUTH EVERY DAY AT NIGHT, Disp: 90 tablet, Rfl: 3  •  omeprazole (priLOSEC) 20 MG capsule, Take 1 capsule by mouth Daily., Disp: 30 capsule, Rfl: 5  •  predniSONE (DELTASONE) 5 MG tablet, 40mg x 3 days, 20mg x 3 days, 10mg x 3 days, 5mg x 3 days, Disp: 45 tablet, Rfl: 0  •  traZODone (DESYREL) 50 MG tablet, Take 50 mg by mouth every night at bedtime., Disp: , Rfl:     Past Surgical History:  Past Surgical History:   Procedure Laterality Date   • CHOLECYSTECTOMY     • LAPAROSCOPIC TUBAL LIGATION         Review of Systems:  Review of Systems   Neurological: Positive for dizziness.       Physical Exam:  Vital Signs:  Vital Signs:   There were no vitals taken for this visit.    Result Review :   {The following data was reviewed by (Optional):85069}  {Ambulatory Labs  (Optional):74578}  {Data reviewed (Optional):28749:::1}         Physical Exam    Assessment and Plan:  Problems Addressed this Visit    None     Diagnoses    None.          {Class 3 Severe Obesity (BMI >=40).:[id number]}      An After Visit Summary and PPPS were given to the patient.       I wore protective equipment throughout this patient encounter to include mask and eyewear. Hand hygiene was performed before donning protective equipment and after removal when leaving the room.

## 2022-08-05 ENCOUNTER — APPOINTMENT (OUTPATIENT)
Dept: CT IMAGING | Facility: HOSPITAL | Age: 52
End: 2022-08-05

## 2022-08-05 ENCOUNTER — HOSPITAL ENCOUNTER (EMERGENCY)
Facility: HOSPITAL | Age: 52
Discharge: HOME | End: 2022-08-05
Attending: EMERGENCY MEDICINE | Admitting: EMERGENCY MEDICINE

## 2022-08-05 VITALS
DIASTOLIC BLOOD PRESSURE: 54 MMHG | RESPIRATION RATE: 18 BRPM | OXYGEN SATURATION: 93 % | WEIGHT: 259 LBS | SYSTOLIC BLOOD PRESSURE: 110 MMHG | HEART RATE: 70 BPM | HEIGHT: 66 IN | TEMPERATURE: 97.7 F | BODY MASS INDEX: 41.62 KG/M2

## 2022-08-05 DIAGNOSIS — R10.13 EPIGASTRIC PAIN: ICD-10-CM

## 2022-08-05 DIAGNOSIS — R11.0 NAUSEA: ICD-10-CM

## 2022-08-05 DIAGNOSIS — K85.90 ACUTE PANCREATITIS, UNSPECIFIED COMPLICATION STATUS, UNSPECIFIED PANCREATITIS TYPE: Primary | ICD-10-CM

## 2022-08-05 LAB
ALBUMIN SERPL-MCNC: 4.1 G/DL (ref 3.5–5.2)
ALBUMIN/GLOB SERPL: 1.3 G/DL
ALP SERPL-CCNC: 84 U/L (ref 39–117)
ALT SERPL W P-5'-P-CCNC: 13 U/L (ref 1–33)
ANION GAP SERPL CALCULATED.3IONS-SCNC: 10 MMOL/L (ref 5–15)
AST SERPL-CCNC: 11 U/L (ref 1–32)
BACTERIA UR QL AUTO: ABNORMAL /HPF
BASOPHILS # BLD AUTO: 0.1 10*3/MM3 (ref 0–0.2)
BASOPHILS NFR BLD AUTO: 0.5 % (ref 0–1.5)
BILIRUB SERPL-MCNC: 0.3 MG/DL (ref 0–1.2)
BILIRUB UR QL STRIP: NEGATIVE
BUN SERPL-MCNC: 13 MG/DL (ref 6–20)
BUN/CREAT SERPL: 14.6 (ref 7–25)
CALCIUM SPEC-SCNC: 9.1 MG/DL (ref 8.6–10.5)
CHLORIDE SERPL-SCNC: 102 MMOL/L (ref 98–107)
CLARITY UR: CLEAR
CO2 SERPL-SCNC: 25 MMOL/L (ref 22–29)
COLOR UR: YELLOW
CREAT SERPL-MCNC: 0.89 MG/DL (ref 0.57–1)
DEPRECATED RDW RBC AUTO: 43.3 FL (ref 37–54)
EGFRCR SERPLBLD CKD-EPI 2021: 77.2 ML/MIN/1.73
EOSINOPHIL # BLD AUTO: 0.2 10*3/MM3 (ref 0–0.4)
EOSINOPHIL NFR BLD AUTO: 1.9 % (ref 0.3–6.2)
ERYTHROCYTE [DISTWIDTH] IN BLOOD BY AUTOMATED COUNT: 14.1 % (ref 12.3–15.4)
GLOBULIN UR ELPH-MCNC: 3.1 GM/DL
GLUCOSE SERPL-MCNC: 97 MG/DL (ref 65–99)
GLUCOSE UR STRIP-MCNC: NEGATIVE MG/DL
HCT VFR BLD AUTO: 40 % (ref 34–46.6)
HGB BLD-MCNC: 13 G/DL (ref 12–15.9)
HGB UR QL STRIP.AUTO: NEGATIVE
HYALINE CASTS UR QL AUTO: ABNORMAL /LPF
KETONES UR QL STRIP: NEGATIVE
LEUKOCYTE ESTERASE UR QL STRIP.AUTO: ABNORMAL
LIPASE SERPL-CCNC: 30 U/L (ref 13–60)
LYMPHOCYTES # BLD AUTO: 1.5 10*3/MM3 (ref 0.7–3.1)
LYMPHOCYTES NFR BLD AUTO: 13.6 % (ref 19.6–45.3)
MCH RBC QN AUTO: 28.1 PG (ref 26.6–33)
MCHC RBC AUTO-ENTMCNC: 32.6 G/DL (ref 31.5–35.7)
MCV RBC AUTO: 86.2 FL (ref 79–97)
MONOCYTES # BLD AUTO: 0.8 10*3/MM3 (ref 0.1–0.9)
MONOCYTES NFR BLD AUTO: 7.2 % (ref 5–12)
NEUTROPHILS NFR BLD AUTO: 76.8 % (ref 42.7–76)
NEUTROPHILS NFR BLD AUTO: 8.5 10*3/MM3 (ref 1.7–7)
NITRITE UR QL STRIP: NEGATIVE
NRBC BLD AUTO-RTO: 0 /100 WBC (ref 0–0.2)
PH UR STRIP.AUTO: 8 [PH] (ref 5–8)
PLATELET # BLD AUTO: 300 10*3/MM3 (ref 140–450)
PMV BLD AUTO: 7.1 FL (ref 6–12)
POTASSIUM SERPL-SCNC: 4.4 MMOL/L (ref 3.5–5.2)
PROT SERPL-MCNC: 7.2 G/DL (ref 6–8.5)
PROT UR QL STRIP: NEGATIVE
RBC # BLD AUTO: 4.64 10*6/MM3 (ref 3.77–5.28)
RBC # UR STRIP: ABNORMAL /HPF
REF LAB TEST METHOD: ABNORMAL
SODIUM SERPL-SCNC: 137 MMOL/L (ref 136–145)
SP GR UR STRIP: 1.02 (ref 1–1.03)
SQUAMOUS #/AREA URNS HPF: ABNORMAL /HPF
TROPONIN T SERPL-MCNC: <0.01 NG/ML (ref 0–0.03)
UROBILINOGEN UR QL STRIP: ABNORMAL
WBC # UR STRIP: ABNORMAL /HPF
WBC NRBC COR # BLD: 11.1 10*3/MM3 (ref 3.4–10.8)

## 2022-08-05 PROCEDURE — 25010000002 ONDANSETRON PER 1 MG: Performed by: PHYSICIAN ASSISTANT

## 2022-08-05 PROCEDURE — 85025 COMPLETE CBC W/AUTO DIFF WBC: CPT | Performed by: PHYSICIAN ASSISTANT

## 2022-08-05 PROCEDURE — 83690 ASSAY OF LIPASE: CPT | Performed by: PHYSICIAN ASSISTANT

## 2022-08-05 PROCEDURE — 93005 ELECTROCARDIOGRAM TRACING: CPT | Performed by: PHYSICIAN ASSISTANT

## 2022-08-05 PROCEDURE — 84484 ASSAY OF TROPONIN QUANT: CPT | Performed by: PHYSICIAN ASSISTANT

## 2022-08-05 PROCEDURE — 74177 CT ABD & PELVIS W/CONTRAST: CPT

## 2022-08-05 PROCEDURE — 25010000002 DIPHENHYDRAMINE PER 50 MG: Performed by: PHYSICIAN ASSISTANT

## 2022-08-05 PROCEDURE — 80053 COMPREHEN METABOLIC PANEL: CPT | Performed by: PHYSICIAN ASSISTANT

## 2022-08-05 PROCEDURE — 81001 URINALYSIS AUTO W/SCOPE: CPT | Performed by: PHYSICIAN ASSISTANT

## 2022-08-05 PROCEDURE — 96374 THER/PROPH/DIAG INJ IV PUSH: CPT

## 2022-08-05 PROCEDURE — 25010000002 MORPHINE PER 10 MG: Performed by: PHYSICIAN ASSISTANT

## 2022-08-05 PROCEDURE — 25010000002 PROCHLORPERAZINE 10 MG/2ML SOLUTION: Performed by: PHYSICIAN ASSISTANT

## 2022-08-05 PROCEDURE — 0 IOPAMIDOL PER 1 ML: Performed by: EMERGENCY MEDICINE

## 2022-08-05 PROCEDURE — 99283 EMERGENCY DEPT VISIT LOW MDM: CPT

## 2022-08-05 PROCEDURE — 96375 TX/PRO/DX INJ NEW DRUG ADDON: CPT

## 2022-08-05 RX ORDER — MORPHINE SULFATE 4 MG/ML
4 INJECTION, SOLUTION INTRAMUSCULAR; INTRAVENOUS ONCE
Status: COMPLETED | OUTPATIENT
Start: 2022-08-05 | End: 2022-08-05

## 2022-08-05 RX ORDER — PROCHLORPERAZINE EDISYLATE 5 MG/ML
10 INJECTION INTRAMUSCULAR; INTRAVENOUS ONCE
Status: COMPLETED | OUTPATIENT
Start: 2022-08-05 | End: 2022-08-05

## 2022-08-05 RX ORDER — DIPHENHYDRAMINE HYDROCHLORIDE 50 MG/ML
25 INJECTION INTRAMUSCULAR; INTRAVENOUS ONCE
Status: COMPLETED | OUTPATIENT
Start: 2022-08-05 | End: 2022-08-05

## 2022-08-05 RX ORDER — ONDANSETRON 2 MG/ML
4 INJECTION INTRAMUSCULAR; INTRAVENOUS ONCE
Status: COMPLETED | OUTPATIENT
Start: 2022-08-05 | End: 2022-08-05

## 2022-08-05 RX ORDER — HYDROCODONE BITARTRATE AND ACETAMINOPHEN 5; 325 MG/1; MG/1
1 TABLET ORAL EVERY 6 HOURS PRN
Qty: 12 TABLET | Refills: 0 | Status: SHIPPED | OUTPATIENT
Start: 2022-08-05 | End: 2022-08-11

## 2022-08-05 RX ORDER — FAMOTIDINE 10 MG/ML
20 INJECTION, SOLUTION INTRAVENOUS ONCE
Status: COMPLETED | OUTPATIENT
Start: 2022-08-05 | End: 2022-08-05

## 2022-08-05 RX ORDER — ONDANSETRON 4 MG/1
4 TABLET, ORALLY DISINTEGRATING ORAL EVERY 8 HOURS PRN
Qty: 20 TABLET | Refills: 0 | Status: SHIPPED | OUTPATIENT
Start: 2022-08-05 | End: 2022-08-11

## 2022-08-05 RX ORDER — SODIUM CHLORIDE 0.9 % (FLUSH) 0.9 %
10 SYRINGE (ML) INJECTION AS NEEDED
Status: DISCONTINUED | OUTPATIENT
Start: 2022-08-05 | End: 2022-08-05 | Stop reason: HOSPADM

## 2022-08-05 RX ADMIN — MORPHINE SULFATE 4 MG: 4 INJECTION INTRAVENOUS at 11:53

## 2022-08-05 RX ADMIN — DIPHENHYDRAMINE HYDROCHLORIDE 25 MG: 50 INJECTION, SOLUTION INTRAMUSCULAR; INTRAVENOUS at 13:14

## 2022-08-05 RX ADMIN — IOPAMIDOL 100 ML: 755 INJECTION, SOLUTION INTRAVENOUS at 12:45

## 2022-08-05 RX ADMIN — ONDANSETRON 4 MG: 2 INJECTION INTRAMUSCULAR; INTRAVENOUS at 11:53

## 2022-08-05 RX ADMIN — SODIUM CHLORIDE 1000 ML: 9 INJECTION, SOLUTION INTRAVENOUS at 14:06

## 2022-08-05 RX ADMIN — FAMOTIDINE 20 MG: 10 INJECTION, SOLUTION INTRAVENOUS at 13:14

## 2022-08-05 RX ADMIN — PROCHLORPERAZINE EDISYLATE 10 MG: 5 INJECTION INTRAMUSCULAR; INTRAVENOUS at 13:14

## 2022-08-05 NOTE — ED PROVIDER NOTES
Subjective   Chief Complaint: Abdominal pain    Patient is a 52-year-old  female with history of anxiety, degenerative disc disease, fibromyalgia, GERD presents the ER complaints of abdominal pain that started yesterday.  She states the pain is mostly in her epigastric abdomen, radiates to the right upper quadrant.  She describes as a constant burning pain that she rates a 6/10.  She reports nausea, no vomiting or diarrhea.  No dysuria or hematuria.  Patient denies any association with food although states that her appetite has decreased due to her nausea and pain.  She states the pain sometimes radiates into her chest as a burning pain and does report history of reflux.  No shortness of breath cough or hemoptysis.  No back pain.  She denies any headache dizziness or blurry vision.  No fever chills.  Abdominal surgical history significant for cholecystectomy.    Location: Abdomen    Quality: Burning    Duration: 2 days    Timing: Constant    Severity: Moderate    Associated Symptoms: Nausea    PCP: Gloria Shore      History provided by:  Patient      Review of Systems   Constitutional: Positive for activity change. Negative for fatigue and fever.   HENT: Negative for sore throat and trouble swallowing.    Eyes: Negative.    Respiratory: Negative for shortness of breath and wheezing.    Cardiovascular: Negative for chest pain.   Gastrointestinal: Positive for abdominal pain and nausea. Negative for diarrhea and vomiting.   Endocrine: Negative.    Genitourinary: Negative for difficulty urinating and dysuria.   Musculoskeletal: Negative for back pain and myalgias.   Skin: Negative for rash.   Allergic/Immunologic: Negative.    Neurological: Negative for weakness and headaches.   Psychiatric/Behavioral: Negative for behavioral problems.   All other systems reviewed and are negative.      Past Medical History:   Diagnosis Date   • Abnormal stress test 9/23/2019    Per patient report.  Will refer to cardiology.    • Adult situational stress disorder 9/23/2019    Increased a little because of work stress. Discussed relaxation and coping. Good social support, no suicidal or homicidal ideation. Diagnosis and treatment discussed. Discussed counseling. Discussed medication, dosing and adverse effects.   • Allergic rhinitis due to pollen 9/23/2019   • Anxiety 9/23/2019    Good social support, no suicidal or homicidal ideation. Diagnosis and treatment discussed. Discussed counseling. Discussed medication, dosing and adverse effects. Return in 4 weeks   • Arthritis 9/23/2019    She is not interested in medications at this time, no interested in PT.  She reports a possible side effect to meloxicam (soft tissue bruising?).  No documented NSAID allergy, but she has reservations about using prescription NSAIDS.  Natural treatments discussed today.  Try glucosamine and chondroitin, tumeric and arnica gel.  I do not see rheum labs in her chart, so will proceed with those today   • Asymptomatic varicose veins of bilateral lower extremities 9/23/2019    bilateral, advised elastic stockings Elevation and followup should sxs worsen. Diet exercise and wt loss encouraged.   • Carpal tunnel syndrome 4/16/2014   • Cervical radiculopathy 4/16/2014   • DDD (degenerative disc disease), cervical 9/23/2019    As above.   • Family history of diabetes mellitus 9/23/2019   • Fibromyalgia 9/23/2019    discussed different treatments. Discussed exercise and sunlight. Discussed cymbalta as a treatment She is going to think about it   • Generalized osteoarthritis 9/23/2019   • GERD (gastroesophageal reflux disease) 9/23/2019    stable   • Herpes zoster without complication 9/23/2019    possible right flank, but no rash yet, just pain prescription given as below   • History of varicella 9/23/2019   • Iron deficiency anemia 9/23/2019   • Mild intermittent asthma with acute exacerbation 9/23/2019    exacerbated, inhaler as below. Increase fluids. Tylenol/motrin  for pain or fever. Medication and medication adverse effects discussed.  Follow-up 5-7 days for reevaluation if not improved or sooner if needed.   • Overweight 9/23/2019   • Plantar fasciitis, right 9/23/2019    discussed ice, stretching and NSAIDs   • Radiculopathy 9/23/2019    cervical type, hands on occasion       Allergies   Allergen Reactions   • Beef-Derived Products Anaphylaxis   • Pork-Derived Products Anaphylaxis       Past Surgical History:   Procedure Laterality Date   • CHOLECYSTECTOMY     • LAPAROSCOPIC TUBAL LIGATION         Family History   Problem Relation Age of Onset   • Hyperlipidemia Mother    • Diabetes Mother    • Heart disease Father    • Heart attack Father    • Hyperlipidemia Father    • Diabetes Father    • Heart disease Maternal Uncle        Social History     Socioeconomic History   • Marital status:    Tobacco Use   • Smoking status: Never Smoker   • Smokeless tobacco: Never Used   Vaping Use   • Vaping Use: Never used   Substance and Sexual Activity   • Alcohol use: No   • Drug use: No   • Sexual activity: Yes     Partners: Male           Objective   Physical Exam  Vitals and nursing note reviewed.   Constitutional:       Appearance: Normal appearance. She is well-developed. She is obese. She is not ill-appearing or toxic-appearing.   HENT:      Head: Normocephalic and atraumatic.   Eyes:      Pupils: Pupils are equal, round, and reactive to light.   Cardiovascular:      Rate and Rhythm: Normal rate and regular rhythm.      Heart sounds: Normal heart sounds.   Pulmonary:      Effort: Pulmonary effort is normal. No respiratory distress.      Breath sounds: Normal breath sounds. No wheezing.   Abdominal:      General: Abdomen is flat. Bowel sounds are normal. There is no distension.      Palpations: Abdomen is soft.      Tenderness: There is abdominal tenderness in the right upper quadrant and epigastric area. There is no right CVA tenderness, left CVA tenderness, guarding or  "rebound.   Skin:     General: Skin is warm and dry.      Capillary Refill: Capillary refill takes less than 2 seconds.      Findings: No rash.   Neurological:      General: No focal deficit present.      Mental Status: She is alert and oriented to person, place, and time.      Cranial Nerves: No cranial nerve deficit.   Psychiatric:         Behavior: Behavior normal.         ECG 12 Lead      Date/Time: 8/5/2022 4:57 PM  Performed by: Joslyn Wiley PA  Authorized by: Rey Pham MD   Interpreted by physician  Previous ECG: no previous ECG available  Rhythm: sinus rhythm  Rate: normal  BPM: 83  QRS axis: normal  Conduction: conduction normal  ST Segments: ST segments normal  T Waves: T waves normal  Other: no other findings  Clinical impression: normal ECG                 ED Course  ED Course as of 08/05/22 1658   Fri Aug 05, 2022   1405 Patient reevaluated, reports her pain has improved.,  Nausea has improved as well.  Patient will be given some IV fluids, she is offered admission, states that she would like to think about this.  Will reevaluate in some time. [MC]   1539 Patient reevaluated, reports that she is feeling much better.  Again offered admission for IV hydration, antiemetic, pain control, patient states she prefers to be discharged home. [MC]   1643 Patient able to tolerate water and crackers without difficulty, reports that she feels much better.  She had no vomiting after drinking water.  Patient still requesting to be discharged home. [MC]      ED Course User Index  [MC] Joslyn Wiley PA    /54 (BP Location: Left arm, Patient Position: Sitting)   Pulse 70   Temp 97.7 °F (36.5 °C)   Resp 18   Ht 167.6 cm (66\")   Wt 117 kg (259 lb)   SpO2 93%   BMI 41.80 kg/m²   Labs Reviewed   URINALYSIS W/ MICROSCOPIC IF INDICATED (NO CULTURE) - Abnormal; Notable for the following components:       Result Value    Leuk Esterase, UA Small (1+) (*)     All other components within normal limits "   CBC WITH AUTO DIFFERENTIAL - Abnormal; Notable for the following components:    WBC 11.10 (*)     Neutrophil % 76.8 (*)     Lymphocyte % 13.6 (*)     Neutrophils, Absolute 8.50 (*)     All other components within normal limits   URINALYSIS, MICROSCOPIC ONLY - Abnormal; Notable for the following components:    RBC, UA 0-2 (*)     WBC, UA 3-5 (*)     Squamous Epithelial Cells, UA 7-12 (*)     All other components within normal limits   LIPASE - Normal   TROPONIN (IN-HOUSE) - Normal    Narrative:     Troponin T Reference Range:  <= 0.03 ng/mL-   Negative for AMI  >0.03 ng/mL-     Abnormal for myocardial necrosis.  Clinicians would have to utilize clinical acumen, EKG, Troponin and serial changes to determine if it is an Acute Myocardial Infarction or myocardial injury due to an underlying chronic condition.       Results may be falsely decreased if patient taking Biotin.     COMPREHENSIVE METABOLIC PANEL    Narrative:     GFR Normal >60  Chronic Kidney Disease <60  Kidney Failure <15     CBC AND DIFFERENTIAL    Narrative:     The following orders were created for panel order CBC & Differential.  Procedure                               Abnormality         Status                     ---------                               -----------         ------                     CBC Auto Differential[[id number]]        Abnormal            Final result                 Please view results for these tests on the individual orders.     Medications   sodium chloride 0.9 % flush 10 mL (has no administration in time range)   Morphine sulfate (PF) injection 4 mg (4 mg Intravenous Given 8/5/22 1153)   ondansetron (ZOFRAN) injection 4 mg (4 mg Intravenous Given 8/5/22 1153)   iopamidol (ISOVUE-370) 76 % injection 100 mL (100 mL Intravenous Given 8/5/22 1245)   famotidine (PEPCID) injection 20 mg (20 mg Intravenous Given 8/5/22 1314)   prochlorperazine (COMPAZINE) injection 10 mg (10 mg Intravenous Given 8/5/22 1314)   diphenhydrAMINE  (BENADRYL) injection 25 mg (25 mg Intravenous Given 8/5/22 1314)   sodium chloride 0.9 % bolus 1,000 mL (0 mL Intravenous Stopped 8/5/22 1634)     CT Abdomen Pelvis With Contrast    Result Date: 8/5/2022  Haziness of the fat around the uncinate process of the pancreas is suggestive of mild pancreatic head pancreatitis. Correlate with serum pancreatic enzyme levels. No other acute process is suggested on this exam  Electronically Signed By-Mukul Borden On:8/5/2022 12:59 PM This report was finalized on [id number] by  Mukul Borden, .                                           MDM  Number of Diagnoses or Management Options  Acute pancreatitis, unspecified complication status, unspecified pancreatitis type  Epigastric pain  Nausea  Diagnosis management comments: MEDICAL DECISION  Epic Chart Review: No recent admissions.  Patient was in urgent care prior to ER arrival and referred to the ER.  Comorbidities: Anxiety, degenerative disc disease fibromyalgia, GERD  Differentials: Pancreatitis, choledocholithiasis, GERD, reflux, NSTEMI; this list is not all inclusive and does not constitute the entirety of considered causes  Radiology interpretation:  Images reviewed by me and interpreted by radiologist, as above  Lab interpretation:  Labs viewed by me significant for, as above  EKG interpretation: Reviewed myself interpreted by ER provider, normal sinus rhythm with a rate of 83 with no acute ST changes.    While in the ED IV was placed and labs were obtained appropriate PPE was worn during exam and throughout all encounters with the patient.  Patient had the above evaluation.  IV established, lab work obtained.  Patient given morphine and Zofran for nausea as well as 1 L IV fluids.  Patient still complaining of pain, nausea, was given Compazine, Benadryl, Pepcid.  Lab work relatively unremarkable.  Urinalysis negative for UTI.  Lipase normal.  CT abdomen pelvis significant for haziness of the fat around the unclear  process of the pancreas suggestive of mild pancreatic head pancreatitis.  Findings discussed with patient at bedside.  Patient was recommended admission to the ED observation unit for IV hydration, antiemetic, pain control and GI consultation.  Patient refused, states that she appears to be discharged home.  Patient was given p.o. challenge prior to discharge, she was able to tolerate p.o. water and crackers and reports feeling much better.  Again she was offered admission for observation and hydration, patient again declined.  Patient will be discharged with patient for Norco, Zofran, she was encouraged to follow-up with primary care next week for recheck and to return to the ER for new or worsening symptoms.  Inspect reviewed.    Discharge plan and instructions were discussed with the patient who verbalized understanding and is in agreement with the plan, all questions were answered at this time.  Patient is aware of signs symptoms that would require immediate return to the emergency room.  Patient understands importance of following up with primary care provider for further evaluation and worsening concerns as well as blood pressure recheck in the next 4 weeks.    Patient was discharged in improved stable condition with an upright steady gait.         Amount and/or Complexity of Data Reviewed  Clinical lab tests: reviewed and ordered  Tests in the radiology section of CPT®: reviewed and ordered  Tests in the medicine section of CPT®: reviewed    Patient Progress  Patient progress: stable      Final diagnoses:   Acute pancreatitis, unspecified complication status, unspecified pancreatitis type   Epigastric pain   Nausea       ED Disposition  ED Disposition     ED Disposition   Discharge    Condition   Stable    Comment   --             Gloria Shore MD  [address] [place] [address] [address]  [address]  [place] [address]  [phone]    Schedule an appointment as soon as possible for a visit in 2 days  As needed, If symptoms  "worsen    Migue Carbone MD  [address]  [place] [address] [phone]    Schedule an appointment as soon as possible for a visit in 2 days  As needed, If symptoms worsen         Medication List      New Prescriptions    HYDROcodone-acetaminophen 5-325 MG per tablet  Commonly known as: NORCO  Take 1 tablet by mouth Every 6 (Six) Hours As Needed for Moderate Pain .     ondansetron ODT 4 MG disintegrating tablet  Commonly known as: Zofran ODT  Place 1 tablet under the tongue Every 8 (Eight) Hours As Needed for Nausea.           Where to Get Your Medications      These medications were sent to [facility]pharm[facility] #[id number] - [place] - [address] [facility] - [phone]  - [phone]   [address], [place] [address]    Phone: [phone]   · HYDROcodone-acetaminophen 5-325 MG per tablet  · ondansetron ODT 4 MG disintegrating tablet          Joslyn Wiley PA  08/05/22 1011    "

## 2022-08-05 NOTE — DISCHARGE INSTRUCTIONS
Take Norco as needed for moderate severe pain.  Take Zofran here for nausea vomiting    Drink plenty of fluids.  Recommend clear liquid diet or soft diet for the next few days and increase as tolerated.    No alcohol, no fried, greasy, acidic or spicy food.    Follow-up with primary care for recheck  Follow-up with GI for recheck     return to the ER for new or worsening symptoms

## 2022-08-05 NOTE — ED NOTES
Pt reports fast HR over past 2 weeks,worse with exertion, nausea, c/o epigastric pain since yesterday with worsening nausea and dizziness. Pt denies any v/d or SOA

## 2022-08-07 LAB — QT INTERVAL: 360 MS

## 2022-08-08 ENCOUNTER — TELEPHONE (OUTPATIENT)
Dept: FAMILY MEDICINE CLINIC | Facility: CLINIC | Age: 52
End: 2022-08-08

## 2022-08-08 NOTE — TELEPHONE ENCOUNTER
Caller: Maria Del Carmen Esquivel    Relationship to patient: Self    Best call back number: [phone]    Chief complaint:    ABDOMINAL PAIN  RIGHT UPPER QUADRANT   BURNING  RADIATES ACROSS STOMACH  PATIENT WAS IN THE ER RECENTLY FOR PANCREATITIS  NOTE:  PATIENT WAS HESITANT TO GO TO THE ER.  TRIED TO TRANSFER.  TRIED CALLING PATIENT BACK, WENT TO VOICE MAIL.  LEFT MESSAGE.     Patient directed to call 911 or go to their nearest emergency room.     Patient verbalized understanding: [] Yes  [x] No  If no, why?

## 2022-08-08 NOTE — TELEPHONE ENCOUNTER
Called pt and let her know she said that she was going to wait it out if she gets worse she will go to the ER

## 2022-08-11 ENCOUNTER — OFFICE VISIT (OUTPATIENT)
Dept: FAMILY MEDICINE CLINIC | Facility: CLINIC | Age: 52
End: 2022-08-11

## 2022-08-11 VITALS
WEIGHT: 263.8 LBS | BODY MASS INDEX: 42.4 KG/M2 | TEMPERATURE: 97.5 F | HEIGHT: 66 IN | SYSTOLIC BLOOD PRESSURE: 126 MMHG | HEART RATE: 98 BPM | DIASTOLIC BLOOD PRESSURE: 84 MMHG | RESPIRATION RATE: 18 BRPM | OXYGEN SATURATION: 98 %

## 2022-08-11 DIAGNOSIS — E78.5 DYSLIPIDEMIA: ICD-10-CM

## 2022-08-11 DIAGNOSIS — R00.2 PALPITATIONS: ICD-10-CM

## 2022-08-11 DIAGNOSIS — K85.00 IDIOPATHIC ACUTE PANCREATITIS, UNSPECIFIED COMPLICATION STATUS: Primary | ICD-10-CM

## 2022-08-11 DIAGNOSIS — E66.01 CLASS 3 SEVERE OBESITY DUE TO EXCESS CALORIES WITH SERIOUS COMORBIDITY AND BODY MASS INDEX (BMI) OF 40.0 TO 44.9 IN ADULT: ICD-10-CM

## 2022-08-11 PROBLEM — K85.90 ACUTE PANCREATITIS: Status: ACTIVE | Noted: 2022-08-11

## 2022-08-11 PROBLEM — R07.89 OTHER CHEST PAIN: Status: RESOLVED | Noted: 2022-03-31 | Resolved: 2022-08-11

## 2022-08-11 PROCEDURE — 99214 OFFICE O/P EST MOD 30 MIN: CPT | Performed by: FAMILY MEDICINE

## 2022-08-11 NOTE — PROGRESS NOTES
Subjective   Maria Del Carmen Esquivel is a 52 y.o. female. Presents to [facility]    Maria Del Carmen was seen at [facility] . She was admitted on 08/05/2022  for abdominal pain. She was discharged on 08/05/2022. Discharge diagnosis was pancreatitis. Labs that were performed during the encounter included: CMP-normal, CBC-elevated WBC and tropnonin- normal, lipase-normal, U/A- normal. Diagnostic studies that were performed included: CT Scan-Haziness of the fat around the uncinate process of the pancreas is. Currently Maria Del Carmen receives care at home. Complications from the hospital stay include none. The patient stated that they do not need help with their daily life and activities. The patient stated that they do have emotional support at home.  Current outpatient and discharge medications have been reconciled for the patient.  Reviewed by: Gloria Shore MD        Abdominal Pain  This is a new problem. The current episode started in the past 7 days (started 08/03/2022). The problem occurs daily. The problem has been gradually improving. The pain is located in the RUQ. The pain is at a severity of 0/10. The quality of the pain is burning. The abdominal pain radiates to the RLQ. Associated symptoms include nausea. Pertinent negatives include no arthralgias, belching, constipation, diarrhea, dysuria, fever, frequency, headaches, hematuria or vomiting. The pain is aggravated by eating. Relieved by: rest. Treatments tried: omeprazole, NPO,  The treatment provided moderate relief.      She was in the ER on 8/5 with pancreatitis. She was sent home with pain medication.     She had palpitations prior to this. She has seen Dr Diggs. She has cardiac tests pending.     I personally reviewed and updated the patient's allergies, medications, problem list, and past medical, surgical, social, and family history. I have reviewed and confirmed the accuracy of the History of Present Illness and Review of Symptoms as  documented by the MA/LPN/RN. Gloria Shore MD    Allergies:  Allergies   Allergen Reactions   • Beef-Derived Products Anaphylaxis   • Pork-Derived Products Anaphylaxis       Social History:  Social History     Socioeconomic History   • Marital status:    Tobacco Use   • Smoking status: Never Smoker   • Smokeless tobacco: Never Used   Vaping Use   • Vaping Use: Never used   Substance and Sexual Activity   • Alcohol use: No   • Drug use: No   • Sexual activity: Yes     Partners: Male       Family History:  Family History   Problem Relation Age of Onset   • Hyperlipidemia Mother    • Diabetes Mother    • Heart disease Father    • Heart attack Father    • Hyperlipidemia Father    • Diabetes Father    • Heart disease Maternal Uncle        Past Medical History :  Patient Active Problem List   Diagnosis   • Abnormal stress test   • Adult situational stress disorder   • Allergic rhinitis due to pollen   • Arthritis   • Asymptomatic varicose veins of bilateral lower extremities   • Carpal tunnel syndrome   • DDD (degenerative disc disease), cervical   • Degenerative disc disease at L5-S1 level   • Family history of diabetes mellitus   • Fibromyalgia   • Generalized osteoarthritis   • GERD (gastroesophageal reflux disease)   • Herpes zoster without complication   • History of varicella   • Cervical radiculopathy   • Iron deficiency anemia   • Mild intermittent asthma without complication   • Class 3 severe obesity due to excess calories with serious comorbidity and body mass index (BMI) of 40.0 to 44.9 in adult (HCC)   • Plantar fasciitis, right   • Edema   • Dysuria   • Lateral epicondylitis of right elbow   • Anxiety   • Neck pain   • Acute pancreatitis   • Palpitations   • Dyslipidemia       Medication List:    Current Outpatient Medications:   •  albuterol sulfate  (90 Base) MCG/ACT inhaler, Inhale 2 puffs Every 4 (Four) Hours As Needed for Wheezing., Disp: 6.7 g, Rfl: 5  •  buPROPion XL (WELLBUTRIN XL)  150 MG 24 hr tablet, TAKE 1 TABLET BY MOUTH EVERY DAY, Disp: 90 tablet, Rfl: 2  •  busPIRone (BUSPAR) 10 MG tablet, Take 10 mg by mouth Daily., Disp: , Rfl:   •  famotidine (PEPCID) 10 MG tablet, Take  by mouth., Disp: , Rfl:   •  fluticasone (FLONASE) 50 MCG/ACT nasal spray, 2 sprays into the nostril(s) as directed by provider Daily., Disp: , Rfl:   •  Loratadine 10 MG capsule, Take  by mouth., Disp: , Rfl:   •  meclizine (ANTIVERT) 25 MG tablet, Take 1 tablet by mouth 4 (Four) Times a Day., Disp: 90 tablet, Rfl: 1  •  montelukast (SINGULAIR) 10 MG tablet, TAKE 1 TABLET BY MOUTH EVERY DAY AT NIGHT, Disp: 90 tablet, Rfl: 3  •  omeprazole (priLOSEC) 20 MG capsule, Take 1 capsule by mouth Daily., Disp: 30 capsule, Rfl: 5  •  traZODone (DESYREL) 50 MG tablet, Take 50 mg by mouth every night at bedtime., Disp: , Rfl:   No current facility-administered medications for this visit.    Past Surgical History:  Past Surgical History:   Procedure Laterality Date   • CHOLECYSTECTOMY     • LAPAROSCOPIC TUBAL LIGATION         Review of Systems:  Review of Systems   Constitutional: Negative for activity change and fever.   HENT: Negative for ear pain, rhinorrhea, sinus pressure and voice change.    Eyes: Negative for visual disturbance.   Respiratory: Negative for cough and shortness of breath.    Cardiovascular: Negative for chest pain.   Gastrointestinal: Positive for abdominal pain and nausea. Negative for constipation, diarrhea and vomiting.   Endocrine: Negative for cold intolerance and heat intolerance.   Genitourinary: Negative for dysuria, frequency, hematuria and urgency.   Musculoskeletal: Negative for arthralgias.   Skin: Negative for rash.   Neurological: Negative for syncope.   Hematological: Does not bruise/bleed easily.   Psychiatric/Behavioral: Negative for depressed mood. The patient is not nervous/anxious.        Physical Exam:  Vital Signs:  Vital Signs:   /84 (BP Location: Right arm, Patient Position:  "Sitting, Cuff Size: Large Adult)   Pulse 98   Temp 97.5 °F (36.4 °C)   Resp 18   Ht 167.6 cm (66\")   Wt 120 kg (263 lb 12.8 oz)   SpO2 98%   BMI 42.58 kg/m²     Result Review :   The following data was reviewed by: Gloria Shore MD on 08/11/2022:  CMP    CMP 7/25/22 8/5/22 8/11/22   Glucose 107 (A) 97 91   BUN 22 13 16   Creatinine 1.24 (A) 0.89 0.94   Sodium 137 137 142   Potassium 4.9 4.4 4.8   Chloride 100 102 103   Calcium 10.2 9.1 9.4   Total Protein 7.8  7.1   Albumin 4.6 4.10 4.3   Globulin 3.2  2.8   Total Bilirubin 0.3 0.3 0.2   Alkaline Phosphatase 93 84 85   AST (SGOT) 17 11 15   ALT (SGPT) 25 13 16   (A) Abnormal value            CBC w/diff    CBC w/Diff 7/25/22 8/5/22   WBC 5.9 11.10 (A)   RBC 4.60 4.64   Hemoglobin 13.3 13.0   Hematocrit 40.5 40.0   MCV 88 86.2   MCH 28.9 28.1   MCHC 32.8 32.6   RDW 13.4 14.1   Platelets 265 300   Neutrophil Rel % 59 76.8 (A)   Lymphocyte Rel % 27 13.6 (A)   Monocyte Rel % 8 7.2   Eosinophil Rel % 4 1.9   Basophil Rel % 1 0.5   (A) Abnormal value             Latest Reference Range & Units 08/05/22 11:42 08/05/22 11:57   Troponin T 0.000 - 0.030 ng/mL  <0.010   Glucose 65 - 99 mg/dL  97   Sodium 136 - 145 mmol/L  137   Potassium 3.5 - 5.2 mmol/L  4.4   CO2 22.0 - 29.0 mmol/L  25.0   Chloride 98 - 107 mmol/L  102   Anion Gap 5.0 - 15.0 mmol/L  10.0   Creatinine 0.57 - 1.00 mg/dL  0.89   BUN 6 - 20 mg/dL  13   BUN/Creatinine Ratio 7.0 - 25.0   14.6   Calcium 8.6 - 10.5 mg/dL  9.1   eGFR >60.0 mL/min/1.73  77.2   Alkaline Phosphatase 39 - 117 U/L  84   Total Protein 6.0 - 8.5 g/dL  7.2   ALT (SGPT) 1 - 33 U/L  13   AST (SGOT) 1 - 32 U/L  11   Total Bilirubin 0.0 - 1.2 mg/dL  0.3   Albumin 3.50 - 5.20 g/dL  4.10   Globulin gm/dL  3.1   A/G Ratio g/dL  1.3   Lipase 13 - 60 U/L  30   WBC 3.40 - 10.80 10*3/mm3  11.10 (H)   RBC 3.77 - 5.28 10*6/mm3  4.64   Hemoglobin 12.0 - 15.9 g/dL  13.0   Hematocrit 34.0 - 46.6 %  40.0   RDW 12.3 - 15.4 %  14.1   MCV 79.0 - 97.0 fL "  86.2   MCH 26.6 - 33.0 pg  28.1   MCHC 31.5 - 35.7 g/dL  32.6   MPV 6.0 - 12.0 fL  7.1   Platelets 140 - 450 10*3/mm3  300   RDW-SD 37.0 - 54.0 fl  43.3   Neutrophil Rel % 42.7 - 76.0 %  76.8 (H)   Lymphocyte Rel % 19.6 - 45.3 %  13.6 (L)   Monocyte Rel % 5.0 - 12.0 %  7.2   Eosinophil Rel % 0.3 - 6.2 %  1.9   Basophil Rel % 0.0 - 1.5 %  0.5   Neutrophils Absolute 1.70 - 7.00 10*3/mm3  8.50 (H)   Lymphocytes Absolute 0.70 - 3.10 10*3/mm3  1.50   Monocytes Absolute 0.10 - 0.90 10*3/mm3  0.80   Eosinophils Absolute 0.00 - 0.40 10*3/mm3  0.20   Basophils Absolute 0.00 - 0.20 10*3/mm3  0.10   nRBC 0.0 - 0.2 /100 WBC  0.0   Color, UA Yellow, Straw  Yellow    Appearance, UA Clear  Clear    Specific Gravity, UA 1.005 - 1.030  1.016    pH, UA 5.0 - 8.0  8.0    Glucose Negative  Negative    Ketones, UA Negative  Negative    Blood, UA Negative  Negative    Nitrite, UA Negative  Negative    Leukocytes, UA Negative  Small (1+) !    Protein, UA Negative  Negative    Bilirubin, UA Negative  Negative    Urobilinogen, UA 0.2 - 1.0 E.U./dL  0.2 E.U./dL    RBC, UA None Seen /HPF 0-2 !    WBC, UA None Seen /HPF 3-5 !    Bacteria, UA None Seen /HPF None Seen    Squamous Epithelial Cells, UA None Seen, 0-2 /HPF 7-12 !    Hyaline Casts, UA None Seen /LPF None Seen    Methodology:  Automated Microscopy    (H): Data is abnormally high  (L): Data is abnormally low  !: Data is abnormal           Study Result    Narrative & Impression      DATE OF EXAM:  8/5/2022 12:44 PM     PROCEDURE:  CT ABDOMEN PELVIS W CONTRAST-     INDICATIONS:   abd pain     COMPARISON:   No Comparisons Available     TECHNIQUE:  Routine transaxial slices were obtained through the abdomen and pelvis  after the intravenous administration of Isovue 370. Reconstructed  coronal and sagittal images were also obtained. Automated exposure  control and iterative construction methods were used.     FINDINGS:  Lower chest: Calcified granuloma in the right middle lobe     Solid  organs: There is slight haziness of the fat adjacent to the  uncinate process of the pancreas. Pancreas enhances normally. Pancreatic  duct normal in caliber. Remaining solid organs unremarkable other than  renal cysts. Splenules present in the splenic hilum. Gallbladder  surgically absent     GI tract: No gastrointestinal abnormality demonstrated. Normal appendix     Pelvis: Reproductive organs within normal limits with T-shaped IUD in  good position. Urinary bladder unremarkable     Peritoneum/retroperitoneum: No ascites or pneumoperitoneum. Normal  caliber aorta     Bones/soft tissues: No acute bony abnormality     IMPRESSION:  Haziness of the fat around the uncinate process of the pancreas is  suggestive of mild pancreatic head pancreatitis. Correlate with serum  pancreatic enzyme levels. No other acute process is suggested on this  exam     Electronically Signed By-Mukul Borden On:8/5/2022 12:59 PM  This report was finalized on [id number] by  Mukul Borden     MDM  Number of Diagnoses or Management Options  Acute pancreatitis, unspecified complication status, unspecified pancreatitis type  Epigastric pain  Nausea  Diagnosis management comments: MEDICAL DECISION  Epic Chart Review: No recent admissions.  Patient was in urgent care prior to ER arrival and referred to the ER.  Comorbidities: Anxiety, degenerative disc disease fibromyalgia, GERD  Differentials: Pancreatitis, choledocholithiasis, GERD, reflux, NSTEMI; this list is not all inclusive and does not constitute the entirety of considered causes  Radiology interpretation:  Images reviewed by me and interpreted by radiologist, as above  Lab interpretation:  Labs viewed by me significant for, as above  EKG interpretation: Reviewed myself interpreted by ER provider, normal sinus rhythm with a rate of 83 with no acute ST changes.     While in the ED IV was placed and labs were obtained appropriate PPE was worn during exam and throughout all encounters  with the patient.  Patient had the above evaluation.  IV established, lab work obtained.  Patient given morphine and Zofran for nausea as well as 1 L IV fluids.  Patient still complaining of pain, nausea, was given Compazine, Benadryl, Pepcid.  Lab work relatively unremarkable.  Urinalysis negative for UTI.  Lipase normal.  CT abdomen pelvis significant for haziness of the fat around the unclear process of the pancreas suggestive of mild pancreatic head pancreatitis.  Findings discussed with patient at bedside.  Patient was recommended admission to the ED observation unit for IV hydration, antiemetic, pain control and GI consultation.  Patient refused, states that she appears to be discharged home.  Patient was given p.o. challenge prior to discharge, she was able to tolerate p.o. water and crackers and reports feeling much better.  Again she was offered admission for observation and hydration, patient again declined.  Patient will be discharged with patient for Norco, Zofran, she was encouraged to follow-up with primary care next week for recheck and to return to the ER for new or worsening symptoms.  Inspect reviewed.     Discharge plan and instructions were discussed with the patient who verbalized understanding and is in agreement with the plan, all questions were answered at this time.  Patient is aware of signs symptoms that would require immediate return to the emergency room.  Patient understands importance of following up with primary care provider for further evaluation and worsening concerns as well as blood pressure recheck in the next 4 weeks.     Patient was discharged in improved stable condition with an upright steady gait.      Physical Exam  Vitals reviewed.   Constitutional:       Appearance: Normal appearance. She is well-developed.   HENT:      Head: Normocephalic and atraumatic.   Eyes:      General:         Right eye: No discharge.         Left eye: No discharge.   Cardiovascular:      Rate  and Rhythm: Normal rate and regular rhythm.      Heart sounds: Normal heart sounds. No murmur heard.    No friction rub. No gallop.   Pulmonary:      Effort: Pulmonary effort is normal. No respiratory distress.      Breath sounds: Normal breath sounds. No wheezing or rales.   Abdominal:      General: Abdomen is flat. Bowel sounds are normal. There is no distension.      Palpations: Abdomen is soft. There is no mass.      Tenderness: There is abdominal tenderness in the epigastric area. There is no guarding or rebound.      Hernia: No hernia is present.   Skin:     General: Skin is warm and dry.      Findings: No rash.   Neurological:      Mental Status: She is alert and oriented to person, place, and time.      Coordination: Coordination normal.      Gait: Gait normal.   Psychiatric:         Behavior: Behavior is cooperative.         Assessment and Plan:  Problems Addressed this Visit        Cardiac and Vasculature    Palpitations     She has testing pending.   They are a little better         Dyslipidemia    Relevant Orders    Lipid Panel With / Chol / HDL Ratio (Completed)       Endocrine and Metabolic    Class 3 severe obesity due to excess calories with serious comorbidity and body mass index (BMI) of 40.0 to 44.9 in adult (HCC)       Gastrointestinal Abdominal     Acute pancreatitis - Primary     Recheck labs  Will consider repeat CT scan    She is a little better. Still with some tenderness         Relevant Orders    CBC & Differential (Completed)    Comprehensive Metabolic Panel (Completed)    Amylase (Completed)    Lipase (Completed)    CT Abdomen With Contrast (Completed)      Diagnoses       Codes Comments    Idiopathic acute pancreatitis, unspecified complication status    -  Primary ICD-10-CM: K85.00  ICD-9-CM: 577.0     Palpitations     ICD-10-CM: R00.2  ICD-9-CM: 785.1     Dyslipidemia     ICD-10-CM: E78.5  ICD-9-CM: 272.4     Class 3 severe obesity due to excess calories with serious comorbidity and  body mass index (BMI) of 40.0 to 44.9 in adult (HCC)     ICD-10-CM: E66.01, Z68.41  ICD-9-CM: 278.01, V85.41            An After Visit Summary and PPPS were given to the patient.       I wore protective equipment throughout this patient encounter to include mask and eye protection. Hand hygiene was performed before donning protective equipment and after removal when leaving the room.

## 2022-08-12 LAB
ALBUMIN SERPL-MCNC: 4.3 G/DL (ref 3.8–4.9)
ALBUMIN/GLOB SERPL: 1.5 {RATIO} (ref 1.2–2.2)
ALP SERPL-CCNC: 85 IU/L (ref 44–121)
ALT SERPL-CCNC: 16 IU/L (ref 0–32)
AMYLASE SERPL-CCNC: 37 U/L (ref 31–110)
AST SERPL-CCNC: 15 IU/L (ref 0–40)
BASOPHILS # BLD AUTO: 0 X10E3/UL (ref 0–0.2)
BASOPHILS NFR BLD AUTO: 1 %
BILIRUB SERPL-MCNC: 0.2 MG/DL (ref 0–1.2)
BUN SERPL-MCNC: 16 MG/DL (ref 6–24)
BUN/CREAT SERPL: 17 (ref 9–23)
CALCIUM SERPL-MCNC: 9.4 MG/DL (ref 8.7–10.2)
CHLORIDE SERPL-SCNC: 103 MMOL/L (ref 96–106)
CHOLEST SERPL-MCNC: 248 MG/DL (ref 100–199)
CHOLEST/HDLC SERPL: 6.2 RATIO (ref 0–4.4)
CO2 SERPL-SCNC: 26 MMOL/L (ref 20–29)
CREAT SERPL-MCNC: 0.94 MG/DL (ref 0.57–1)
EGFRCR SERPLBLD CKD-EPI 2021: 72 ML/MIN/1.73
EOSINOPHIL # BLD AUTO: 0.2 X10E3/UL (ref 0–0.4)
EOSINOPHIL NFR BLD AUTO: 4 %
ERYTHROCYTE [DISTWIDTH] IN BLOOD BY AUTOMATED COUNT: 13.1 % (ref 11.7–15.4)
GLOBULIN SER CALC-MCNC: 2.8 G/DL (ref 1.5–4.5)
GLUCOSE SERPL-MCNC: 91 MG/DL (ref 65–99)
HCT VFR BLD AUTO: 39.7 % (ref 34–46.6)
HDLC SERPL-MCNC: 40 MG/DL
HGB BLD-MCNC: 12.9 G/DL (ref 11.1–15.9)
IMM GRANULOCYTES # BLD AUTO: 0 X10E3/UL (ref 0–0.1)
IMM GRANULOCYTES NFR BLD AUTO: 1 %
LDLC SERPL CALC-MCNC: 179 MG/DL (ref 0–99)
LIPASE SERPL-CCNC: 26 U/L (ref 14–72)
LYMPHOCYTES # BLD AUTO: 1.2 X10E3/UL (ref 0.7–3.1)
LYMPHOCYTES NFR BLD AUTO: 23 %
MCH RBC QN AUTO: 28.7 PG (ref 26.6–33)
MCHC RBC AUTO-ENTMCNC: 32.5 G/DL (ref 31.5–35.7)
MCV RBC AUTO: 88 FL (ref 79–97)
MONOCYTES # BLD AUTO: 0.4 X10E3/UL (ref 0.1–0.9)
MONOCYTES NFR BLD AUTO: 8 %
NEUTROPHILS # BLD AUTO: 3.4 X10E3/UL (ref 1.4–7)
NEUTROPHILS NFR BLD AUTO: 63 %
PLATELET # BLD AUTO: 321 X10E3/UL (ref 150–450)
POTASSIUM SERPL-SCNC: 4.8 MMOL/L (ref 3.5–5.2)
PROT SERPL-MCNC: 7.1 G/DL (ref 6–8.5)
RBC # BLD AUTO: 4.5 X10E6/UL (ref 3.77–5.28)
SODIUM SERPL-SCNC: 142 MMOL/L (ref 134–144)
TRIGL SERPL-MCNC: 157 MG/DL (ref 0–149)
VLDLC SERPL CALC-MCNC: 29 MG/DL (ref 5–40)
WBC # BLD AUTO: 5.4 X10E3/UL (ref 3.4–10.8)

## 2022-08-17 ENCOUNTER — HOSPITAL ENCOUNTER (OUTPATIENT)
Dept: CT IMAGING | Facility: HOSPITAL | Age: 52
Discharge: HOME | End: 2022-08-17
Admitting: FAMILY MEDICINE

## 2022-08-17 PROCEDURE — 74160 CT ABDOMEN W/CONTRAST: CPT

## 2022-08-17 PROCEDURE — 0 IOPAMIDOL PER 1 ML: Performed by: FAMILY MEDICINE

## 2022-08-17 RX ADMIN — IOPAMIDOL 100 ML: 755 INJECTION, SOLUTION INTRAVENOUS at 11:27

## 2022-08-18 ENCOUNTER — HOSPITAL ENCOUNTER (OUTPATIENT)
Dept: NUCLEAR MEDICINE | Facility: HOSPITAL | Age: 52
Discharge: HOME | End: 2022-08-18

## 2022-08-18 ENCOUNTER — OFFICE VISIT (OUTPATIENT)
Dept: FAMILY MEDICINE CLINIC | Facility: CLINIC | Age: 52
End: 2022-08-18

## 2022-08-18 VITALS
SYSTOLIC BLOOD PRESSURE: 132 MMHG | WEIGHT: 265.6 LBS | TEMPERATURE: 97.3 F | BODY MASS INDEX: 42.68 KG/M2 | RESPIRATION RATE: 18 BRPM | HEART RATE: 100 BPM | OXYGEN SATURATION: 98 % | DIASTOLIC BLOOD PRESSURE: 84 MMHG | HEIGHT: 66 IN

## 2022-08-18 DIAGNOSIS — R00.2 PALPITATIONS: ICD-10-CM

## 2022-08-18 DIAGNOSIS — K85.00 IDIOPATHIC ACUTE PANCREATITIS, UNSPECIFIED COMPLICATION STATUS: ICD-10-CM

## 2022-08-18 DIAGNOSIS — R07.2 CHEST PAIN, PRECORDIAL: ICD-10-CM

## 2022-08-18 DIAGNOSIS — R07.89 OTHER CHEST PAIN: ICD-10-CM

## 2022-08-18 DIAGNOSIS — R42 DIZZINESS: ICD-10-CM

## 2022-08-18 DIAGNOSIS — E78.2 MIXED HYPERLIPIDEMIA: ICD-10-CM

## 2022-08-18 DIAGNOSIS — K21.9 GASTROESOPHAGEAL REFLUX DISEASE, UNSPECIFIED WHETHER ESOPHAGITIS PRESENT: ICD-10-CM

## 2022-08-18 DIAGNOSIS — E66.01 CLASS 3 SEVERE OBESITY DUE TO EXCESS CALORIES WITH SERIOUS COMORBIDITY AND BODY MASS INDEX (BMI) OF 40.0 TO 44.9 IN ADULT: ICD-10-CM

## 2022-08-18 DIAGNOSIS — R06.02 SHORTNESS OF BREATH: ICD-10-CM

## 2022-08-18 DIAGNOSIS — R10.11 RIGHT UPPER QUADRANT ABDOMINAL PAIN: Primary | ICD-10-CM

## 2022-08-18 PROBLEM — E78.5 DYSLIPIDEMIA: Status: RESOLVED | Noted: 2022-08-11 | Resolved: 2022-08-18

## 2022-08-18 PROCEDURE — 25010000002 REGADENOSON 0.4 MG/5ML SOLUTION: Performed by: INTERNAL MEDICINE

## 2022-08-18 PROCEDURE — 93018 CV STRESS TEST I&R ONLY: CPT | Performed by: INTERNAL MEDICINE

## 2022-08-18 PROCEDURE — 78452 HT MUSCLE IMAGE SPECT MULT: CPT

## 2022-08-18 PROCEDURE — 93017 CV STRESS TEST TRACING ONLY: CPT

## 2022-08-18 PROCEDURE — A9502 TC99M TETROFOSMIN: HCPCS | Performed by: INTERNAL MEDICINE

## 2022-08-18 PROCEDURE — 78452 HT MUSCLE IMAGE SPECT MULT: CPT | Performed by: INTERNAL MEDICINE

## 2022-08-18 PROCEDURE — 93016 CV STRESS TEST SUPVJ ONLY: CPT | Performed by: INTERNAL MEDICINE

## 2022-08-18 PROCEDURE — 0 TECHNETIUM TETROFOSMIN KIT: Performed by: INTERNAL MEDICINE

## 2022-08-18 PROCEDURE — 99214 OFFICE O/P EST MOD 30 MIN: CPT | Performed by: FAMILY MEDICINE

## 2022-08-18 RX ORDER — ROSUVASTATIN CALCIUM 5 MG/1
5 TABLET, COATED ORAL DAILY
Qty: 30 TABLET | Refills: 12 | Status: SHIPPED | OUTPATIENT
Start: 2022-08-18 | End: 2022-09-11

## 2022-08-18 RX ADMIN — TETROFOSMIN 1 DOSE: 1.38 INJECTION, POWDER, LYOPHILIZED, FOR SOLUTION INTRAVENOUS at 14:11

## 2022-08-18 RX ADMIN — TETROFOSMIN 1 DOSE: 1.38 INJECTION, POWDER, LYOPHILIZED, FOR SOLUTION INTRAVENOUS at 13:25

## 2022-08-18 RX ADMIN — REGADENOSON 0.4 MG: 0.08 INJECTION, SOLUTION INTRAVENOUS at 14:11

## 2022-08-18 NOTE — ASSESSMENT & PLAN NOTE
Patient's (Body mass index is 42.87 kg/m².) indicates that they are obese (BMI >30) with health conditions that include dyslipidemias . Weight is worsening. BMI is is above average; BMI management plan is completed. We discussed portion control and increasing exercise.

## 2022-08-19 ENCOUNTER — TELEPHONE (OUTPATIENT)
Dept: CARDIOLOGY | Facility: CLINIC | Age: 52
End: 2022-08-19

## 2022-08-19 LAB
BH CV REST NUCLEAR ISOTOPE DOSE: 9.3 MCI
BH CV STRESS NUCLEAR ISOTOPE DOSE: 28 MCI
LV EF NUC BP: 61 %
MAXIMAL PREDICTED HEART RATE: 166 BPM
STRESS TARGET HR: 141 BPM

## 2022-08-22 ENCOUNTER — HOSPITAL ENCOUNTER (OUTPATIENT)
Dept: CARDIOLOGY | Facility: HOSPITAL | Age: 52
Discharge: HOME | End: 2022-08-22

## 2022-08-22 VITALS
DIASTOLIC BLOOD PRESSURE: 71 MMHG | WEIGHT: 264.55 LBS | BODY MASS INDEX: 42.52 KG/M2 | HEIGHT: 66 IN | SYSTOLIC BLOOD PRESSURE: 139 MMHG

## 2022-08-22 DIAGNOSIS — R06.02 SHORTNESS OF BREATH: ICD-10-CM

## 2022-08-22 DIAGNOSIS — R07.89 OTHER CHEST PAIN: ICD-10-CM

## 2022-08-22 DIAGNOSIS — R07.2 CHEST PAIN, PRECORDIAL: ICD-10-CM

## 2022-08-22 DIAGNOSIS — R42 DIZZINESS: ICD-10-CM

## 2022-08-22 DIAGNOSIS — R00.2 PALPITATIONS: ICD-10-CM

## 2022-08-22 PROCEDURE — 93270 REMOTE 30 DAY ECG REV/REPORT: CPT

## 2022-08-22 PROCEDURE — 93306 TTE W/DOPPLER COMPLETE: CPT

## 2022-08-22 PROCEDURE — 93306 TTE W/DOPPLER COMPLETE: CPT | Performed by: INTERNAL MEDICINE

## 2022-08-26 NOTE — ASSESSMENT & PLAN NOTE
Improving  Limit tobacco, alcohol, caffeine, chocalate, citrus fruits, recumbency after meals and large portions. Discussed link between PPI's and increased risk of hip, wrist, and spine fractures

## 2022-08-28 LAB
BH CV ECHO MEAS - ACS: 1.57 CM
BH CV ECHO MEAS - AO MAX PG: 8.3 MMHG
BH CV ECHO MEAS - AO ROOT DIAM: 2.7 CM
BH CV ECHO MEAS - AO V2 MAX: 144 CM/SEC
BH CV ECHO MEAS - EDV(CUBED): 137.8 ML
BH CV ECHO MEAS - EDV(MOD-SP4): 55.2 ML
BH CV ECHO MEAS - EF(MOD-BP): 55 %
BH CV ECHO MEAS - EF(MOD-SP4): 54.3 %
BH CV ECHO MEAS - ESV(CUBED): 50.3 ML
BH CV ECHO MEAS - ESV(MOD-SP4): 25.2 ML
BH CV ECHO MEAS - FS: 28.5 %
BH CV ECHO MEAS - IVS/LVPW: 0.95 CM
BH CV ECHO MEAS - IVSD: 0.78 CM
BH CV ECHO MEAS - LA DIMENSION: 3 CM
BH CV ECHO MEAS - LV DIASTOLIC VOL/BSA (35-75): 24.5 CM2
BH CV ECHO MEAS - LV MASS(C)D: 143.7 GRAMS
BH CV ECHO MEAS - LV MAX PG: 4.6 MMHG
BH CV ECHO MEAS - LV SYSTOLIC VOL/BSA (12-30): 11.2 CM2
BH CV ECHO MEAS - LV V1 MAX: 106.8 CM/SEC
BH CV ECHO MEAS - LVIDD: 5.2 CM
BH CV ECHO MEAS - LVIDS: 3.7 CM
BH CV ECHO MEAS - LVOT AREA: 3.2 CM2
BH CV ECHO MEAS - LVOT DIAM: 2.02 CM
BH CV ECHO MEAS - LVPWD: 0.82 CM
BH CV ECHO MEAS - MR MAX PG: 59.4 MMHG
BH CV ECHO MEAS - MR MAX VEL: 385.4 CM/SEC
BH CV ECHO MEAS - MV A MAX VEL: 85.7 CM/SEC
BH CV ECHO MEAS - MV DEC SLOPE: 397.2 CM/SEC2
BH CV ECHO MEAS - MV DEC TIME: 0.26 MSEC
BH CV ECHO MEAS - MV E MAX VEL: 102.8 CM/SEC
BH CV ECHO MEAS - MV E/A: 1.2
BH CV ECHO MEAS - MV MAX PG: 4.2 MMHG
BH CV ECHO MEAS - MV MEAN PG: 1.84 MMHG
BH CV ECHO MEAS - MV V2 VTI: 32.6 CM
BH CV ECHO MEAS - PA ACC TIME: 0.1 SEC
BH CV ECHO MEAS - PA PR(ACCEL): 35.3 MMHG
BH CV ECHO MEAS - PA V2 MAX: 99.3 CM/SEC
BH CV ECHO MEAS - PI END-D VEL: 122.4 CM/SEC
BH CV ECHO MEAS - PULM A REVS DUR: 0.09 SEC
BH CV ECHO MEAS - PULM A REVS VEL: 33.6 CM/SEC
BH CV ECHO MEAS - PULM DIAS VEL: 40.5 CM/SEC
BH CV ECHO MEAS - PULM S/D: 1.5
BH CV ECHO MEAS - PULM SYS VEL: 60.8 CM/SEC
BH CV ECHO MEAS - RAP SYSTOLE: 10 MMHG
BH CV ECHO MEAS - RVSP: 41.8 MMHG
BH CV ECHO MEAS - SI(MOD-SP4): 13.3 ML/M2
BH CV ECHO MEAS - SV(MOD-SP4): 30 ML
BH CV ECHO MEAS - TAPSE (>1.6): 1.9 CM
BH CV ECHO MEAS - TR MAX PG: 31.8 MMHG
BH CV ECHO MEAS - TR MAX VEL: 281 CM/SEC
BH CV XLRA - RV BASE: 3 CM
BH CV XLRA - RV MID: 2.1 CM
MAXIMAL PREDICTED HEART RATE: 166 BPM
STRESS TARGET HR: 141 BPM

## 2022-08-29 ENCOUNTER — APPOINTMENT (OUTPATIENT)
Dept: RESPIRATORY THERAPY | Facility: HOSPITAL | Age: 52
End: 2022-08-29

## 2022-08-29 ENCOUNTER — APPOINTMENT (OUTPATIENT)
Dept: NUCLEAR MEDICINE | Facility: HOSPITAL | Age: 52
End: 2022-08-29

## 2022-08-29 ENCOUNTER — APPOINTMENT (OUTPATIENT)
Dept: CARDIOLOGY | Facility: HOSPITAL | Age: 52
End: 2022-08-29

## 2022-09-11 DIAGNOSIS — E78.2 MIXED HYPERLIPIDEMIA: ICD-10-CM

## 2022-09-11 LAB
MAXIMAL PREDICTED HEART RATE: 166 BPM
STRESS TARGET HR: 141 BPM
TOAL ENROLLMENT DAYS: 6

## 2022-09-11 PROCEDURE — 93272 ECG/REVIEW INTERPRET ONLY: CPT | Performed by: INTERNAL MEDICINE

## 2022-09-11 RX ORDER — ROSUVASTATIN CALCIUM 5 MG/1
TABLET, COATED ORAL
Qty: 30 TABLET | Refills: 12 | Status: SHIPPED | OUTPATIENT
Start: 2022-09-11

## 2022-09-13 ENCOUNTER — TELEPHONE (OUTPATIENT)
Dept: CARDIOLOGY | Facility: CLINIC | Age: 52
End: 2022-09-13

## 2022-09-14 NOTE — PROGRESS NOTES
Date of Office Visit: 09/15/2022  Encounter Provider: Dr. Eric Diggs  Place of Service: [facility] CARDIOLOGY [place]  Patient Name: Maria Del Carmen Esquivel  :1970  Gloria Shore MD    Chief Complaint   Patient presents with   • Chest Pain   • Follow-up   • Palpitations     History of Present Illness    I am pleased to see Mrs. Esquivel in my office today as a follow-up.    As you know, patient is 52-year-old white female whose past medical history significant for asthma, anxiety disorder, mild obesity, who is referred to me for symptom of palpitation and shortness of breath.    In 2022, patient developed symptom of palpitation.  She described this as a racing of the heart.  She was at her work initially when she had that episode.  At that time vital signs showed heart rate of 86 bpm and blood pressure of 144/70 mmHg.  To the next few days patient had heart rate of 148 bpm.  Patient complain of dizziness.  Patient also complain of chest pain.  Patient complain of shortness of breath on exertion.  Patient has exertional complaint of palpitation.  Patient has not had any syncope or presyncope.  No orthopnea or PND no significant leg edema.    Patient does not have previous history of CAD, PCI or MI.  Patient does not smoke or abuse alcohol.  Patient does consume significant amount of caffeine    Holter monitor showed normal sinus rhythm.  Frequent PVCs were reported by computer but I reviewed tracing myself.  Patient does have PVCs but there was no ventricular tachycardia but significant burden of baseline artifact is noted.  Holter monitor with good tracing did not show any VT.    In 2022, patient underwent echocardiogram which showed normal left ventricular size and function with a EF of 55 to 60%.  Mild TR was noted.  Patient had elevated RV systolic pressure of 41 mmHg.  Stress test was negative for ischemia.    Since the previous visit, patient symptom of palpitations are less after she has  decreased the caffeine intake.  She still drinks Mountain Dew.  Her resting heart rate is elevated.  Patient symptom of palpitations are still present but less often no chest pain.  No orthopnea PND she complain of shortness of breath    At this stage, I would recommend to start Toprol-XL 25 mg daily.  Patient is referred for sleep center for possible sleep study.  I suspect she has sleep apnea.  She does have history of significant snoring, fatigue and apneic spells.  Patient is advised to decrease caffeine intake.  Blood pressure monitoring is recommended.    Past Medical History:   Diagnosis Date   • Abnormal stress test 9/23/2019    Per patient report.  Will refer to cardiology.   • Adult situational stress disorder 9/23/2019    Increased a little because of work stress. Discussed relaxation and coping. Good social support, no suicidal or homicidal ideation. Diagnosis and treatment discussed. Discussed counseling. Discussed medication, dosing and adverse effects.   • Allergic rhinitis due to pollen 9/23/2019   • Anxiety 9/23/2019    Good social support, no suicidal or homicidal ideation. Diagnosis and treatment discussed. Discussed counseling. Discussed medication, dosing and adverse effects. Return in 4 weeks   • Arthritis 9/23/2019    She is not interested in medications at this time, no interested in PT.  She reports a possible side effect to meloxicam (soft tissue bruising?).  No documented NSAID allergy, but she has reservations about using prescription NSAIDS.  Natural treatments discussed today.  Try glucosamine and chondroitin, tumeric and arnica gel.  I do not see rheum labs in her chart, so will proceed with those today   • Asymptomatic varicose veins of bilateral lower extremities 9/23/2019    bilateral, advised elastic stockings Elevation and followup should sxs worsen. Diet exercise and wt loss encouraged.   • Carpal tunnel syndrome 4/16/2014   • Cervical radiculopathy 4/16/2014   • DDD  (degenerative disc disease), cervical 9/23/2019    As above.   • Family history of diabetes mellitus 9/23/2019   • Fibromyalgia 9/23/2019    discussed different treatments. Discussed exercise and sunlight. Discussed cymbalta as a treatment She is going to think about it   • Generalized osteoarthritis 9/23/2019   • GERD (gastroesophageal reflux disease) 9/23/2019    stable   • Herpes zoster without complication 9/23/2019    possible right flank, but no rash yet, just pain prescription given as below   • History of varicella 9/23/2019   • Iron deficiency anemia 9/23/2019   • Mild intermittent asthma with acute exacerbation 9/23/2019    exacerbated, inhaler as below. Increase fluids. Tylenol/motrin for pain or fever. Medication and medication adverse effects discussed.  Follow-up 5-7 days for reevaluation if not improved or sooner if needed.   • Overweight 9/23/2019   • Plantar fasciitis, right 9/23/2019    discussed ice, stretching and NSAIDs   • Radiculopathy 9/23/2019    cervical type, hands on occasion         Past Surgical History:   Procedure Laterality Date   • CHOLECYSTECTOMY     • LAPAROSCOPIC TUBAL LIGATION             Current Outpatient Medications:   •  albuterol sulfate  (90 Base) MCG/ACT inhaler, Inhale 2 puffs Every 4 (Four) Hours As Needed for Wheezing., Disp: 6.7 g, Rfl: 5  •  famotidine (PEPCID) 10 MG tablet, Take  by mouth., Disp: , Rfl:   •  fluticasone (FLONASE) 50 MCG/ACT nasal spray, 2 sprays into the nostril(s) as directed by provider Daily., Disp: , Rfl:   •  Loratadine 10 MG capsule, Take  by mouth., Disp: , Rfl:   •  montelukast (SINGULAIR) 10 MG tablet, TAKE 1 TABLET BY MOUTH EVERY DAY AT NIGHT, Disp: 90 tablet, Rfl: 3  •  omeprazole (priLOSEC) 20 MG capsule, Take 1 capsule by mouth Daily., Disp: 30 capsule, Rfl: 5  •  rosuvastatin (CRESTOR) 5 MG tablet, TAKE 1 TABLET BY MOUTH EVERY DAY, Disp: 30 tablet, Rfl: 12  •  traZODone (DESYREL) 50 MG tablet, Take 50 mg by mouth every night at  "bedtime., Disp: , Rfl:   •  metoprolol succinate XL (TOPROL-XL) 25 MG 24 hr tablet, Take 1 tablet by mouth Daily., Disp: 90 tablet, Rfl: 3      Social History     Socioeconomic History   • Marital status:    Tobacco Use   • Smoking status: Never Smoker   • Smokeless tobacco: Never Used   Vaping Use   • Vaping Use: Never used   Substance and Sexual Activity   • Alcohol use: No   • Drug use: No   • Sexual activity: Yes     Partners: Male         Review of Systems   Constitutional: Negative for chills and fever.   HENT: Negative for ear discharge and nosebleeds.    Eyes: Negative for discharge and redness.   Cardiovascular: Positive for palpitations. Negative for chest pain, orthopnea, paroxysmal nocturnal dyspnea and syncope.   Respiratory: Positive for shortness of breath. Negative for cough and wheezing.    Endocrine: Negative for heat intolerance.   Skin: Negative for rash.   Musculoskeletal: Negative for arthritis and myalgias.   Gastrointestinal: Negative for abdominal pain, melena, nausea and vomiting.   Genitourinary: Negative for dysuria and hematuria.   Neurological: Negative for dizziness, light-headedness, numbness and tremors.   Psychiatric/Behavioral: Negative for depression. The patient is not nervous/anxious.        Procedures    Procedures    No orders to display           Objective:    /84 (BP Location: Left arm, Patient Position: Sitting, Cuff Size: Large Adult)   Pulse 88   Ht 167.6 cm (65.98\")   Wt 120 kg (264 lb)   SpO2 99%   BMI 42.63 kg/m²         Constitutional:       Appearance: Well-developed.   Eyes:      General: No scleral icterus.        Right eye: No discharge.   HENT:      Head: Normocephalic and atraumatic.   Neck:      Thyroid: No thyromegaly.      Lymphadenopathy: No cervical adenopathy.   Pulmonary:      Effort: Pulmonary effort is normal. No respiratory distress.      Breath sounds: Normal breath sounds. No wheezing. No rales.   Cardiovascular:      Normal rate. " Regular rhythm.      No gallop.   Abdominal:      Tenderness: There is no abdominal tenderness.   Skin:     Findings: No erythema or rash.   Neurological:      Mental Status: Alert and oriented to person, place, and time.             Assessment:       Diagnosis Plan   1. Palpitations  metoprolol succinate XL (TOPROL-XL) 25 MG 24 hr tablet   2. Mixed hyperlipidemia  metoprolol succinate XL (TOPROL-XL) 25 MG 24 hr tablet   3. Sleep apnea, unspecified type  Ambulatory Referral to Sleep Medicine    metoprolol succinate XL (TOPROL-XL) 25 MG 24 hr tablet            Plan:       MDM:    1.  Palpitation:    Patient symptoms are still present but they are less often start Toprol-XL.  Recommend monitoring the blood pressure    2.  Sleep apnea:    Refer the patient to sleep center.    3.  Possible ventricular tachycardia:    I will continue to observe.  Stress test and echocardiogram is unremarkable.  If there is recurrence of symptoms patient may need loop recorder implantation.

## 2022-09-15 ENCOUNTER — OFFICE VISIT (OUTPATIENT)
Dept: CARDIOLOGY | Facility: CLINIC | Age: 52
End: 2022-09-15

## 2022-09-15 VITALS
HEART RATE: 88 BPM | OXYGEN SATURATION: 99 % | SYSTOLIC BLOOD PRESSURE: 122 MMHG | DIASTOLIC BLOOD PRESSURE: 84 MMHG | BODY MASS INDEX: 42.43 KG/M2 | WEIGHT: 264 LBS | HEIGHT: 66 IN

## 2022-09-15 DIAGNOSIS — E78.2 MIXED HYPERLIPIDEMIA: ICD-10-CM

## 2022-09-15 DIAGNOSIS — G47.30 SLEEP APNEA, UNSPECIFIED TYPE: ICD-10-CM

## 2022-09-15 DIAGNOSIS — R00.2 PALPITATIONS: ICD-10-CM

## 2022-09-15 PROCEDURE — 99214 OFFICE O/P EST MOD 30 MIN: CPT | Performed by: INTERNAL MEDICINE

## 2022-09-15 RX ORDER — METOPROLOL SUCCINATE 25 MG/1
25 TABLET, EXTENDED RELEASE ORAL DAILY
Qty: 90 TABLET | Refills: 3 | Status: SHIPPED | OUTPATIENT
Start: 2022-09-15 | End: 2023-01-12

## 2022-09-29 ENCOUNTER — OFFICE (OUTPATIENT)
Dept: URBAN - METROPOLITAN AREA CLINIC 64 | Facility: CLINIC | Age: 52
End: 2022-09-29

## 2022-09-29 VITALS
HEART RATE: 65 BPM | DIASTOLIC BLOOD PRESSURE: 80 MMHG | WEIGHT: 266 LBS | SYSTOLIC BLOOD PRESSURE: 131 MMHG | HEIGHT: 66 IN

## 2022-09-29 DIAGNOSIS — Z12.11 ENCOUNTER FOR SCREENING FOR MALIGNANT NEOPLASM OF COLON: ICD-10-CM

## 2022-09-29 DIAGNOSIS — K21.9 GASTRO-ESOPHAGEAL REFLUX DISEASE WITHOUT ESOPHAGITIS: ICD-10-CM

## 2022-09-29 DIAGNOSIS — R10.11 RIGHT UPPER QUADRANT PAIN: ICD-10-CM

## 2022-09-29 DIAGNOSIS — K85.90 ACUTE PANCREATITIS WITHOUT NECROSIS OR INFECTION, UNSPECIFIE: ICD-10-CM

## 2022-09-29 PROCEDURE — 99204 OFFICE O/P NEW MOD 45 MIN: CPT

## 2022-11-02 DIAGNOSIS — K21.9 GASTROESOPHAGEAL REFLUX DISEASE, UNSPECIFIED WHETHER ESOPHAGITIS PRESENT: ICD-10-CM

## 2022-11-02 RX ORDER — OMEPRAZOLE 20 MG/1
20 CAPSULE, DELAYED RELEASE ORAL DAILY
Qty: 30 CAPSULE | Refills: 5 | Status: SHIPPED | OUTPATIENT
Start: 2022-11-02

## 2023-01-12 ENCOUNTER — OFFICE VISIT (OUTPATIENT)
Dept: FAMILY MEDICINE CLINIC | Facility: CLINIC | Age: 53
End: 2023-01-12
Payer: COMMERCIAL

## 2023-01-12 VITALS
RESPIRATION RATE: 16 BRPM | OXYGEN SATURATION: 98 % | HEART RATE: 84 BPM | WEIGHT: 272.2 LBS | BODY MASS INDEX: 43.75 KG/M2 | TEMPERATURE: 98.4 F | DIASTOLIC BLOOD PRESSURE: 62 MMHG | HEIGHT: 66 IN | SYSTOLIC BLOOD PRESSURE: 124 MMHG

## 2023-01-12 DIAGNOSIS — E66.01 CLASS 3 SEVERE OBESITY DUE TO EXCESS CALORIES WITH SERIOUS COMORBIDITY AND BODY MASS INDEX (BMI) OF 40.0 TO 44.9 IN ADULT: ICD-10-CM

## 2023-01-12 DIAGNOSIS — J45.20 MILD INTERMITTENT ASTHMA WITHOUT COMPLICATION: ICD-10-CM

## 2023-01-12 DIAGNOSIS — J06.9 URI WITH COUGH AND CONGESTION: Primary | ICD-10-CM

## 2023-01-12 LAB
EXPIRATION DATE: NORMAL
FLUAV AG UPPER RESP QL IA.RAPID: NOT DETECTED
FLUBV AG UPPER RESP QL IA.RAPID: NOT DETECTED
INTERNAL CONTROL: NORMAL
Lab: NORMAL
SARS-COV-2 AG UPPER RESP QL IA.RAPID: NOT DETECTED

## 2023-01-12 PROCEDURE — 99213 OFFICE O/P EST LOW 20 MIN: CPT | Performed by: STUDENT IN AN ORGANIZED HEALTH CARE EDUCATION/TRAINING PROGRAM

## 2023-01-12 PROCEDURE — 87428 SARSCOV & INF VIR A&B AG IA: CPT | Performed by: STUDENT IN AN ORGANIZED HEALTH CARE EDUCATION/TRAINING PROGRAM

## 2023-01-12 RX ORDER — AZITHROMYCIN 250 MG/1
TABLET, FILM COATED ORAL
Qty: 6 TABLET | Refills: 0 | Status: SHIPPED | OUTPATIENT
Start: 2023-01-12 | End: 2023-03-16

## 2023-01-12 RX ORDER — ALBUTEROL SULFATE 90 UG/1
2 AEROSOL, METERED RESPIRATORY (INHALATION) EVERY 4 HOURS PRN
Qty: 6.7 G | Refills: 5 | Status: SHIPPED | OUTPATIENT
Start: 2023-01-12 | End: 2023-03-16

## 2023-01-12 RX ORDER — PROMETHAZINE HYDROCHLORIDE AND CODEINE PHOSPHATE 6.25; 1 MG/5ML; MG/5ML
5 SYRUP ORAL EVERY 4 HOURS PRN
Qty: 118 ML | Refills: 0 | Status: SHIPPED | OUTPATIENT
Start: 2023-01-12 | End: 2023-03-16

## 2023-01-12 NOTE — PROGRESS NOTES
Subjective   Maria Del Carmen Esquivel is a 52 y.o. female.   Chief Complaint   Patient presents with   • URI       History of Present Illness     URI symptoms:  -Started 2-3 days ago  - last week helped with her grandchildren who went to  on Tuesday/wednesday and had ear infections and pink eye and bronchitis  -Symptoms:  Productive Cough, nasal and chest congestion, left ear pain (resolved), anterior lymph node pain, rhinorrhea, fatigued, sinus pressure  -Pt denies headache, nausea, vomiting, body aches, chills, sore throat, appetite change, diarrhea, body aches  -Treatment:  Dayquil, Mucinex with slight improvement.   - has tried tessalon perles but they did not help  - uses flonase normally, takes claritin seasonally  - pseudephed dries her up but she'll use on occasion.     -When patient has a bacterial infection, she likes azithromycin/Z-Rai.  States that she has anything else she will need Diflucan for the yeast infection  -Requesting cough suppressant with codeine  -Would like a work excuse      The following portions of the patient's history were reviewed and updated as appropriate: allergies, current medications, past family history, past medical history, past social history, past surgical history and problem list.    Patient Active Problem List   Diagnosis   • Abnormal stress test   • Adult situational stress disorder   • Allergic rhinitis due to pollen   • Arthritis   • Asymptomatic varicose veins of bilateral lower extremities   • Carpal tunnel syndrome   • DDD (degenerative disc disease), cervical   • Degenerative disc disease at L5-S1 level   • Family history of diabetes mellitus   • Fibromyalgia   • Generalized osteoarthritis   • GERD (gastroesophageal reflux disease)   • Herpes zoster without complication   • History of varicella   • Cervical radiculopathy   • Iron deficiency anemia   • Mild intermittent asthma without complication   • Class 3 severe obesity due to excess calories with serious  "comorbidity and body mass index (BMI) of 40.0 to 44.9 in adult (HCC)   • Plantar fasciitis, right   • Edema   • Dysuria   • Lateral epicondylitis of right elbow   • Anxiety   • Neck pain   • Acute pancreatitis   • Palpitations   • Mixed hyperlipidemia       Current Outpatient Medications on File Prior to Visit   Medication Sig Dispense Refill   • fluticasone (FLONASE) 50 MCG/ACT nasal spray 2 sprays into the nostril(s) as directed by provider Daily.     • Loratadine 10 MG capsule Take  by mouth.     • montelukast (SINGULAIR) 10 MG tablet TAKE 1 TABLET BY MOUTH EVERY DAY AT NIGHT 90 tablet 3   • omeprazole (priLOSEC) 20 MG capsule Take 1 capsule by mouth Daily. 30 capsule 5   • rosuvastatin (CRESTOR) 5 MG tablet TAKE 1 TABLET BY MOUTH EVERY DAY 30 tablet 12   • traZODone (DESYREL) 50 MG tablet Take 50 mg by mouth every night at bedtime.     • [DISCONTINUED] albuterol sulfate  (90 Base) MCG/ACT inhaler Inhale 2 puffs Every 4 (Four) Hours As Needed for Wheezing. 6.7 g 5   • [DISCONTINUED] metoprolol succinate XL (TOPROL-XL) 25 MG 24 hr tablet Take 1 tablet by mouth Daily. 90 tablet 3     No current facility-administered medications on file prior to visit.     Current outpatient and discharge medications have been reconciled for the patient.  Reviewed by: Kira Marion DO      Allergies   Allergen Reactions   • Beef-Derived Products Anaphylaxis   • Pork-Derived Products Anaphylaxis         Objective   Visit Vitals  /62 (BP Location: Right arm, Patient Position: Sitting, Cuff Size: Large Adult)   Pulse 84   Temp 98.4 °F (36.9 °C) (Skin)   Resp 16   Ht 167.6 cm (65.98\")   Wt 123 kg (272 lb 3.2 oz)   SpO2 98%   BMI 43.96 kg/m²       Physical Exam  HENT:      Right Ear: Tympanic membrane normal.      Left Ear: Tympanic membrane normal.      Mouth/Throat:      Mouth: Mucous membranes are moist.      Pharynx: Oropharynx is clear. No oropharyngeal exudate.      Comments: - some slight erythema and cobblestoning " of the throat  Eyes:      Conjunctiva/sclera: Conjunctivae normal.   Cardiovascular:      Rate and Rhythm: Normal rate and regular rhythm.      Heart sounds: Normal heart sounds.   Pulmonary:      Effort: Pulmonary effort is normal.      Breath sounds: Normal breath sounds.   Lymphadenopathy:      Cervical: Cervical adenopathy present.   Neurological:      Mental Status: She is alert.           Diagnoses and all orders for this visit:    1. URI with cough and congestion (Primary)  - POCT SARS-CoV-2 Antigen STAR + Flu: All negative  - Gave patient work excuse from 1/11/2023 through 1/17/2023.  Stated she could return to work on Monday if she is feeling better and has been fever free for 24 hours without acetaminophen or ibuprofen  -Told patient I would like her to try to recover on her own for at least 3 more days (about 1 week total) in case she is sick from a virus  -Sent in Z-Rai 250 mg Take 2 tablets by mouth on day 1, then 1 tablet daily on days 2-5  Dispense: 6 tablet; Refill: 0 to dispense in 3 more days for her to  if she is not starting to feel better  -  albuterol sulfate  (90 Base) MCG/ACT inhaler; Inhale 2 puffs Every 4 (Four) Hours As Needed for Wheezing.  Dispense: 6.7 g; Refill: 5  -     promethazine-codeine (PHENERGAN with CODEINE) 6.25-10 MG/5ML syrup; Take 5 mL by mouth Every 4 (Four) Hours As Needed for Cough.  Dispense: 118 mL; Refill: 0    2. Mild intermittent asthma without complication  -     albuterol sulfate  (90 Base) MCG/ACT inhaler; Inhale 2 puffs Every 4 (Four) Hours As Needed for Wheezing.  Dispense: 6.7 g; Refill: 5    3. Class 3 severe obesity due to excess calories with serious comorbidity and body mass index (BMI) of 40.0 to 44.9 in adult (HCC)  Assessment & Plan:  Patient's (Body mass index is 43.96 kg/m².) indicates that they are morbidly/severely obese (BMI > 40 or > 35 with obesity - related health condition) with health conditions that include dyslipidemias .  Weight is unchanged. BMI is is above average; BMI management plan is completed. We discussed portion control and increasing exercise.        Follow Up  -As needed    Expected course, medications, and adverse effects discussed as appropriate.  Call or return if worsening or persistent symptoms.  I wore protective equipment throughout this patient encounter to include mask and eye protection. Hand hygiene was performed before donning protective equipment and after removal when leaving the room.    This document is intended for medical expert use only. Reading of this document by patients and/or patient's family without participating medical staff guidance may result in misinterpretation and unintended morbidity. Any interpretation of such data is the responsibility of the patient and/or family member responsible for the patient in concert with their primary or specialist providers, not to be left for sources of online searches such as [employer], [employer] or similar queries. Relying on these approaches to knowledge may result in misinterpretation, misguided goals of care and even death should patients or family members try recommendations outside of the realm of professional medical care.

## 2023-01-12 NOTE — ASSESSMENT & PLAN NOTE
Patient's (Body mass index is 43.96 kg/m².) indicates that they are morbidly/severely obese (BMI > 40 or > 35 with obesity - related health condition) with health conditions that include dyslipidemias . Weight is unchanged. BMI is is above average; BMI management plan is completed. We discussed portion control and increasing exercise.

## 2023-01-12 NOTE — LETTER
January 12, 2023     Patient: Maria Del Carmen Esquivel   YOB: 1970   Date of Visit: 1/12/2023       To Whom It May Concern:    It is my medical opinion that Maria Del Carmen Esquivel is sick and needs to be excused from 11/11/23 through 11/17/23. May return to work on 11/16/23 if feeling better and has been fever free for 24 hours without acetaminophen or ibuprofen.           Sincerely,        Kira Marion DO    CC:   No Recipients

## 2023-01-20 ENCOUNTER — OFFICE (OUTPATIENT)
Dept: URBAN - METROPOLITAN AREA PATHOLOGY 4 | Facility: PATHOLOGY | Age: 53
End: 2023-01-20
Payer: COMMERCIAL

## 2023-01-20 ENCOUNTER — ON CAMPUS - OUTPATIENT (OUTPATIENT)
Dept: URBAN - METROPOLITAN AREA HOSPITAL 2 | Facility: HOSPITAL | Age: 53
End: 2023-01-20
Payer: COMMERCIAL

## 2023-01-20 VITALS
DIASTOLIC BLOOD PRESSURE: 58 MMHG | DIASTOLIC BLOOD PRESSURE: 77 MMHG | SYSTOLIC BLOOD PRESSURE: 129 MMHG | DIASTOLIC BLOOD PRESSURE: 69 MMHG | RESPIRATION RATE: 18 BRPM | DIASTOLIC BLOOD PRESSURE: 74 MMHG | HEART RATE: 73 BPM | RESPIRATION RATE: 14 BRPM | SYSTOLIC BLOOD PRESSURE: 116 MMHG | HEART RATE: 78 BPM | HEART RATE: 79 BPM | DIASTOLIC BLOOD PRESSURE: 79 MMHG | OXYGEN SATURATION: 99 % | HEART RATE: 74 BPM | SYSTOLIC BLOOD PRESSURE: 124 MMHG | DIASTOLIC BLOOD PRESSURE: 63 MMHG | WEIGHT: 262 LBS | DIASTOLIC BLOOD PRESSURE: 70 MMHG | HEART RATE: 71 BPM | TEMPERATURE: 96.8 F | HEART RATE: 75 BPM | HEART RATE: 80 BPM | SYSTOLIC BLOOD PRESSURE: 109 MMHG | RESPIRATION RATE: 19 BRPM | RESPIRATION RATE: 17 BRPM | SYSTOLIC BLOOD PRESSURE: 114 MMHG | SYSTOLIC BLOOD PRESSURE: 137 MMHG | OXYGEN SATURATION: 100 % | HEIGHT: 66 IN | SYSTOLIC BLOOD PRESSURE: 110 MMHG

## 2023-01-20 DIAGNOSIS — D12.2 BENIGN NEOPLASM OF ASCENDING COLON: ICD-10-CM

## 2023-01-20 DIAGNOSIS — Z12.11 ENCOUNTER FOR SCREENING FOR MALIGNANT NEOPLASM OF COLON: ICD-10-CM

## 2023-01-20 PROBLEM — K63.5 POLYP OF COLON: Status: ACTIVE | Noted: 2023-01-20

## 2023-01-20 LAB
GI HISTOLOGY: A. UNSPECIFIED: (no result)
GI HISTOLOGY: PDF REPORT: (no result)

## 2023-01-20 PROCEDURE — 45385 COLONOSCOPY W/LESION REMOVAL: CPT | Mod: 33 | Performed by: INTERNAL MEDICINE

## 2023-01-20 PROCEDURE — 88305 TISSUE EXAM BY PATHOLOGIST: CPT | Performed by: INTERNAL MEDICINE

## 2023-02-02 ENCOUNTER — OFFICE (OUTPATIENT)
Dept: URBAN - METROPOLITAN AREA CLINIC 64 | Facility: CLINIC | Age: 53
End: 2023-02-02

## 2023-02-02 VITALS
SYSTOLIC BLOOD PRESSURE: 129 MMHG | WEIGHT: 266 LBS | DIASTOLIC BLOOD PRESSURE: 81 MMHG | HEIGHT: 66 IN | HEART RATE: 76 BPM

## 2023-02-02 DIAGNOSIS — K21.9 GASTRO-ESOPHAGEAL REFLUX DISEASE WITHOUT ESOPHAGITIS: ICD-10-CM

## 2023-02-02 DIAGNOSIS — R13.10 DYSPHAGIA, UNSPECIFIED: ICD-10-CM

## 2023-02-02 DIAGNOSIS — R10.13 EPIGASTRIC PAIN: ICD-10-CM

## 2023-02-02 DIAGNOSIS — K59.00 CONSTIPATION, UNSPECIFIED: ICD-10-CM

## 2023-02-02 DIAGNOSIS — R10.11 RIGHT UPPER QUADRANT PAIN: ICD-10-CM

## 2023-02-02 PROCEDURE — 99214 OFFICE O/P EST MOD 30 MIN: CPT

## 2023-02-02 RX ORDER — DICYCLOMINE HYDROCHLORIDE 20 MG/1
TABLET ORAL
Qty: 60 | Refills: 12 | Status: COMPLETED
End: 2023-03-02

## 2023-02-09 RX ORDER — TRAZODONE HYDROCHLORIDE 50 MG/1
50 TABLET ORAL
Qty: 30 TABLET | Refills: 3 | Status: SHIPPED | OUTPATIENT
Start: 2023-02-09

## 2023-03-02 ENCOUNTER — OFFICE (OUTPATIENT)
Dept: URBAN - METROPOLITAN AREA PATHOLOGY 4 | Facility: PATHOLOGY | Age: 53
End: 2023-03-02

## 2023-03-02 ENCOUNTER — ON CAMPUS - OUTPATIENT (OUTPATIENT)
Dept: URBAN - METROPOLITAN AREA HOSPITAL 2 | Facility: HOSPITAL | Age: 53
End: 2023-03-02

## 2023-03-02 VITALS
OXYGEN SATURATION: 100 % | SYSTOLIC BLOOD PRESSURE: 132 MMHG | SYSTOLIC BLOOD PRESSURE: 129 MMHG | RESPIRATION RATE: 16 BRPM | DIASTOLIC BLOOD PRESSURE: 88 MMHG | HEART RATE: 82 BPM | HEART RATE: 88 BPM | HEIGHT: 66 IN | TEMPERATURE: 97.8 F | RESPIRATION RATE: 18 BRPM | SYSTOLIC BLOOD PRESSURE: 131 MMHG | HEART RATE: 80 BPM | SYSTOLIC BLOOD PRESSURE: 138 MMHG | WEIGHT: 265 LBS | DIASTOLIC BLOOD PRESSURE: 84 MMHG | DIASTOLIC BLOOD PRESSURE: 67 MMHG | SYSTOLIC BLOOD PRESSURE: 143 MMHG | HEART RATE: 71 BPM | OXYGEN SATURATION: 99 % | OXYGEN SATURATION: 97 % | DIASTOLIC BLOOD PRESSURE: 78 MMHG

## 2023-03-02 DIAGNOSIS — R13.10 DYSPHAGIA, UNSPECIFIED: ICD-10-CM

## 2023-03-02 DIAGNOSIS — K21.9 GASTRO-ESOPHAGEAL REFLUX DISEASE WITHOUT ESOPHAGITIS: ICD-10-CM

## 2023-03-02 DIAGNOSIS — K31.89 OTHER DISEASES OF STOMACH AND DUODENUM: ICD-10-CM

## 2023-03-02 DIAGNOSIS — K29.50 UNSPECIFIED CHRONIC GASTRITIS WITHOUT BLEEDING: ICD-10-CM

## 2023-03-02 DIAGNOSIS — R10.13 EPIGASTRIC PAIN: ICD-10-CM

## 2023-03-02 LAB
GI HISTOLOGY: A. SELECT: (no result)
GI HISTOLOGY: PDF REPORT: (no result)

## 2023-03-02 PROCEDURE — 43239 EGD BIOPSY SINGLE/MULTIPLE: CPT | Performed by: INTERNAL MEDICINE

## 2023-03-02 PROCEDURE — 43450 DILATE ESOPHAGUS 1/MULT PASS: CPT | Performed by: INTERNAL MEDICINE

## 2023-03-02 PROCEDURE — 88342 IMHCHEM/IMCYTCHM 1ST ANTB: CPT | Performed by: INTERNAL MEDICINE

## 2023-03-02 PROCEDURE — 88305 TISSUE EXAM BY PATHOLOGIST: CPT | Performed by: INTERNAL MEDICINE

## 2023-03-02 RX ORDER — SUCRALFATE 1 G/1
TABLET ORAL
Qty: 90 | Refills: 4 | Status: ACTIVE

## 2023-03-15 NOTE — PROGRESS NOTES
Date of Office Visit: 2023  Encounter Provider: Dr. Eric Diggs  Place of Service: [facility] CARDIOLOGY [place]  Patient Name: Marai Del Carmen Esquivel  :1970  Gloria Shore MD    Chief Complaint   Patient presents with   • Palpitations   • Hyperlipidemia   • Follow-up     History of Present Illness    I am pleased to see Mrs. Esquivel in my office today as a follow-up.    As you know, patient is 52-year-old white female whose past medical history significant for asthma, anxiety disorder, mild obesity, who is referred to me for symptom of palpitation and shortness of breath.    In 2022, patient developed symptom of palpitation.  She described this as a racing of the heart.  She was at her work initially when she had that episode.  At that time vital signs showed heart rate of 86 bpm and blood pressure of 144/70 mmHg.  To the next few days patient had heart rate of 148 bpm.  Patient complain of dizziness.  Patient also complain of chest pain.  Patient complain of shortness of breath on exertion.  Patient has exertional complaint of palpitation.  Patient has not had any syncope or presyncope.  No orthopnea or PND no significant leg edema.    Patient does not have previous history of CAD, PCI or MI.  Patient does not smoke or abuse alcohol.  Patient does consume significant amount of caffeine    Holter monitor showed normal sinus rhythm.  Frequent PVCs were reported by computer but I reviewed tracing myself.  Patient does have PVCs but there was no ventricular tachycardia but significant burden of baseline artifact is noted.  Holter monitor with good tracing did not show any VT.    In 2022, patient underwent echocardiogram which showed normal left ventricular size and function with a EF of 55 to 60%.  Mild TR was noted.  Patient had elevated RV systolic pressure of 41 mmHg.  Stress test was negative for ischemia.    On previous visit, I recommended to start Toprol-XL and refer the patient for  sleep center.  Unfortunately patient did not go for sleep studies.  She also did not start Toprol-XL.  Patient came today and feeling better.  She said that she has decreased caffeine intake and since then her palpitations are much better.  Patient wants to lose weight.  Patient reports that she cannot lose by dieting.  Patient denies any chest pain or tightness or heaviness.    EKG showed sinus rhythm with nonspecific ST changes.    At this stage, I would recommend that patient should proceed with lifestyle modification.  I encouraged her to proceed with sleep studies but she was not inclined.  I will see the patient as needed.  Blood pressure monitoring is recommended          Past Medical History:   Diagnosis Date   • Abnormal stress test 09/23/2019    Per patient report.  Will refer to cardiology.   • Adult situational stress disorder 09/23/2019    Increased a little because of work stress. Discussed relaxation and coping. Good social support, no suicidal or homicidal ideation. Diagnosis and treatment discussed. Discussed counseling. Discussed medication, dosing and adverse effects.   • Allergic rhinitis due to pollen 09/23/2019   • Anxiety 09/23/2019    Good social support, no suicidal or homicidal ideation. Diagnosis and treatment discussed. Discussed counseling. Discussed medication, dosing and adverse effects. Return in 4 weeks   • Arthritis 09/23/2019    She is not interested in medications at this time, no interested in PT.  She reports a possible side effect to meloxicam (soft tissue bruising?).  No documented NSAID allergy, but she has reservations about using prescription NSAIDS.  Natural treatments discussed today.  Try glucosamine and chondroitin, tumeric and arnica gel.  I do not see rheum labs in her chart, so will proceed with those today   • Asymptomatic varicose veins of bilateral lower extremities 09/23/2019    bilateral, advised elastic stockings Elevation and followup should sxs worsen. Diet  exercise and wt loss encouraged.   • Carpal tunnel syndrome 04/16/2014   • Cervical radiculopathy 04/16/2014   • DDD (degenerative disc disease), cervical 09/23/2019    As above.   • Family history of diabetes mellitus 09/23/2019   • Fibromyalgia 09/23/2019    discussed different treatments. Discussed exercise and sunlight. Discussed cymbalta as a treatment She is going to think about it   • Generalized osteoarthritis 09/23/2019   • GERD (gastroesophageal reflux disease) 09/23/2019    stable   • Herpes zoster without complication 09/23/2019    possible right flank, but no rash yet, just pain prescription given as below   • History of varicella 09/23/2019   • Hyperlipidemia    • Iron deficiency anemia 09/23/2019   • Mild intermittent asthma with acute exacerbation 09/23/2019    exacerbated, inhaler as below. Increase fluids. Tylenol/motrin for pain or fever. Medication and medication adverse effects discussed.  Follow-up 5-7 days for reevaluation if not improved or sooner if needed.   • Overweight 09/23/2019   • Plantar fasciitis, right 09/23/2019    discussed ice, stretching and NSAIDs   • Radiculopathy 09/23/2019    cervical type, hands on occasion         Past Surgical History:   Procedure Laterality Date   • CHOLECYSTECTOMY     • LAPAROSCOPIC TUBAL LIGATION             Current Outpatient Medications:   •  fluticasone (FLONASE) 50 MCG/ACT nasal spray, 2 sprays into the nostril(s) as directed by provider Daily., Disp: , Rfl:   •  Loratadine 10 MG capsule, Take  by mouth., Disp: , Rfl:   •  montelukast (SINGULAIR) 10 MG tablet, TAKE 1 TABLET BY MOUTH EVERY DAY AT NIGHT, Disp: 90 tablet, Rfl: 3  •  omeprazole (priLOSEC) 20 MG capsule, Take 1 capsule by mouth Daily., Disp: 30 capsule, Rfl: 5  •  rosuvastatin (CRESTOR) 5 MG tablet, TAKE 1 TABLET BY MOUTH EVERY DAY, Disp: 30 tablet, Rfl: 12  •  traZODone (DESYREL) 50 MG tablet, Take 1 tablet by mouth every night at bedtime., Disp: 30 tablet, Rfl: 3      Social History  "    Socioeconomic History   • Marital status:    Tobacco Use   • Smoking status: Never   • Smokeless tobacco: Never   Vaping Use   • Vaping Use: Never used   Substance and Sexual Activity   • Alcohol use: No   • Drug use: No   • Sexual activity: Yes     Partners: Male         Review of Systems   Constitutional: Negative for chills and fever.   HENT: Negative for ear discharge and nosebleeds.    Eyes: Negative for discharge and redness.   Cardiovascular: Negative for chest pain, orthopnea, palpitations, paroxysmal nocturnal dyspnea and syncope.   Respiratory: Negative for cough, shortness of breath and wheezing.    Endocrine: Negative for heat intolerance.   Skin: Negative for rash.   Musculoskeletal: Negative for arthritis and myalgias.   Gastrointestinal: Negative for abdominal pain, melena, nausea and vomiting.   Genitourinary: Negative for dysuria and hematuria.   Neurological: Negative for dizziness, light-headedness, numbness and tremors.   Psychiatric/Behavioral: Negative for depression. The patient is not nervous/anxious.        Procedures      ECG 12 Lead    Date/Time: 3/16/2023 2:07 PM  Performed by: Eric Diggs MD  Authorized by: Eric Diggs MD   Comparison: compared with previous ECG   Similar to previous ECG  Rhythm: sinus rhythm  Other findings: non-specific ST-T wave changes    Clinical impression: normal ECG            ECG 12 Lead    (Results Pending)           Objective:    /82 (BP Location: Right arm, Patient Position: Sitting, Cuff Size: Large Adult)   Pulse 81   Ht 167.6 cm (65.98\")   Wt 120 kg (265 lb)   SpO2 99%   BMI 42.79 kg/m²         Constitutional:       Appearance: Well-developed.   Eyes:      General: No scleral icterus.        Right eye: No discharge.   HENT:      Head: Normocephalic and atraumatic.   Neck:      Thyroid: No thyromegaly.      Lymphadenopathy: No cervical adenopathy.   Pulmonary:      Effort: Pulmonary effort is normal. No respiratory distress.      " Breath sounds: Normal breath sounds. No wheezing. No rales.   Cardiovascular:      Normal rate. Regular rhythm.      No gallop.   Abdominal:      Tenderness: There is no abdominal tenderness.   Skin:     Findings: No erythema or rash.   Neurological:      Mental Status: Alert and oriented to person, place, and time.             Assessment:       Diagnosis Plan   1. Mixed hyperlipidemia  ECG 12 Lead      2. Palpitations  ECG 12 Lead               Plan:       MDM:    1.  Palpitation:    Her symptoms of palpitations are much better.  Patient is advised to consider sleep studies in future.    2.  PVCs:    Patient PVCs are contained.  Continue to monitor    3.  Hyperlipidemia:    Patient is on Crestor

## 2023-03-16 ENCOUNTER — OFFICE VISIT (OUTPATIENT)
Dept: CARDIOLOGY | Facility: CLINIC | Age: 53
End: 2023-03-16
Payer: COMMERCIAL

## 2023-03-16 VITALS
BODY MASS INDEX: 42.59 KG/M2 | OXYGEN SATURATION: 99 % | SYSTOLIC BLOOD PRESSURE: 120 MMHG | HEIGHT: 66 IN | DIASTOLIC BLOOD PRESSURE: 82 MMHG | WEIGHT: 265 LBS | HEART RATE: 81 BPM

## 2023-03-16 DIAGNOSIS — E78.2 MIXED HYPERLIPIDEMIA: Primary | ICD-10-CM

## 2023-03-16 DIAGNOSIS — R00.2 PALPITATIONS: ICD-10-CM

## 2023-03-16 PROCEDURE — 99214 OFFICE O/P EST MOD 30 MIN: CPT | Performed by: INTERNAL MEDICINE

## 2023-03-16 PROCEDURE — 93000 ELECTROCARDIOGRAM COMPLETE: CPT | Performed by: INTERNAL MEDICINE

## 2023-03-21 ENCOUNTER — TELEPHONE (OUTPATIENT)
Dept: FAMILY MEDICINE CLINIC | Facility: CLINIC | Age: 53
End: 2023-03-21

## 2023-03-21 NOTE — TELEPHONE ENCOUNTER
Caller: Maria Del Carmen Esquivel    Relationship: Self    Best call back number:    [phone] (Mobile)       What was the call regarding: [facility] SENT A FAX TO THE OFFICE REGARDING A MEDICATION [place]     CAN YOU CALL AND VERIFY IF THE PHARMACY HAS SENT OVER FAX         Do you require a callback: YES

## 2023-05-21 DIAGNOSIS — K21.9 GASTROESOPHAGEAL REFLUX DISEASE, UNSPECIFIED WHETHER ESOPHAGITIS PRESENT: ICD-10-CM

## 2023-05-21 RX ORDER — OMEPRAZOLE 20 MG/1
CAPSULE, DELAYED RELEASE ORAL
Qty: 30 CAPSULE | Refills: 5 | Status: SHIPPED | OUTPATIENT
Start: 2023-05-21

## 2023-06-19 RX ORDER — TRAZODONE HYDROCHLORIDE 50 MG/1
TABLET ORAL
Qty: 30 TABLET | Refills: 3 | Status: SHIPPED | OUTPATIENT
Start: 2023-06-19

## 2023-06-29 ENCOUNTER — TELEPHONE (OUTPATIENT)
Dept: FAMILY MEDICINE CLINIC | Facility: CLINIC | Age: 53
End: 2023-06-29

## 2023-06-29 NOTE — TELEPHONE ENCOUNTER
Caller: Maria Del Carmen Esquivel    Relationship: Self    Best call back number: [phone] (Mobile)     What medication are you requesting: INCREASE DOSAGE IN [place]          Have you had these symptoms before:    [x] Yes  [] No    Have you been treated for these symptoms before:   [x] Yes  [] No    If a prescription is needed, what is your preferred pharmacy and phone number: [facility] - [place], [place] -[phone] [facility], [place] - [address] [facility] [phone] [facility] [phone]      Additional notes:      LOSING WEIGHT, BUT NOW SHE'S NOT FEELING THE FULLNESS OR LOSING AS MUCH

## 2023-07-25 NOTE — PROGRESS NOTES
Subjective   Maria Del Carmen Esquivel is a 53 y.o. female. Presents to [facility]    Chief Complaint   Patient presents with    Obesity       Obesity  This is a chronic problem. The current episode started more than 1 month ago. The problem has been gradually improving. Pertinent negatives include no abdominal pain, fatigue, headaches, numbness, vomiting or weakness. Nothing aggravates the symptoms. Treatments tried: semaglutide. The treatment provided moderate relief.      She is doing well with her ozempic. Very little side effects. She gets occasional heart burn. She started with 0.25mg. Then up to 0.5mg and did not lose any weight. Then she went up to 1mg and has lost weight on it. No swelling in her throat or hoarseness. No pancreatitis.     I personally reviewed and updated the patient's allergies, medications, problem list, and past medical, surgical, social, and family history. I have reviewed and confirmed the accuracy of the History of Present Illness and Review of Symptoms as documented by the MA/LPN/RN. Gloria Shore MD    Allergies:  Allergies   Allergen Reactions    Beef-Derived Products Anaphylaxis    Pork-Derived Products Anaphylaxis       Social History:  Social History     Socioeconomic History    Marital status:    Tobacco Use    Smoking status: Never    Smokeless tobacco: Never   Vaping Use    Vaping Use: Never used   Substance and Sexual Activity    Alcohol use: No    Drug use: No    Sexual activity: Yes     Partners: Male       Family History:  Family History   Problem Relation Age of Onset    Hyperlipidemia Mother     Diabetes Mother     Heart disease Father     Heart attack Father     Hyperlipidemia Father     Diabetes Father     Heart disease Maternal Uncle        Past Medical History :  Patient Active Problem List   Diagnosis    Abnormal stress test    Adult situational stress disorder    Allergic rhinitis due to pollen    Arthritis    Asymptomatic varicose veins of  bilateral lower extremities    Carpal tunnel syndrome    DDD (degenerative disc disease), cervical    Degenerative disc disease at L5-S1 level    Family history of diabetes mellitus    Fibromyalgia    Generalized osteoarthritis    GERD (gastroesophageal reflux disease)    Herpes zoster without complication    History of varicella    Cervical radiculopathy    Iron deficiency anemia    Mild intermittent asthma without complication    Class 3 severe obesity due to excess calories with serious comorbidity and body mass index (BMI) of 40.0 to 44.9 in adult    Plantar fasciitis, right    Edema    Dysuria    Lateral epicondylitis of right elbow    Anxiety    Neck pain    Palpitations    Mixed hyperlipidemia    Allergy to alpha-gal       Medication List:    Current Outpatient Medications:     fluticasone (FLONASE) 50 MCG/ACT nasal spray, 2 sprays into the nostril(s) as directed by provider Daily., Disp: , Rfl:     Loratadine 10 MG capsule, Take  by mouth., Disp: , Rfl:     montelukast (SINGULAIR) 10 MG tablet, TAKE 1 TABLET BY MOUTH EVERY DAY AT NIGHT, Disp: 90 tablet, Rfl: 3    omeprazole (priLOSEC) 20 MG capsule, TAKE 1 CAPSULE BY MOUTH EVERY DAY, Disp: 30 capsule, Rfl: 5    rosuvastatin (CRESTOR) 5 MG tablet, TAKE 1 TABLET BY MOUTH EVERY DAY, Disp: 30 tablet, Rfl: 12    Semaglutide, 1 MG/DOSE, (OZEMPIC) 4 MG/3ML solution pen-injector, Inject 1 mg under the skin into the appropriate area as directed., Disp: , Rfl:     traZODone (DESYREL) 50 MG tablet, TAKE 1 TABLET BY MOUTH EVERYDAY AT BEDTIME, Disp: 30 tablet, Rfl: 3    Past Surgical History:  Past Surgical History:   Procedure Laterality Date    CHOLECYSTECTOMY      LAPAROSCOPIC TUBAL LIGATION           Physical Exam:      Vital Signs:    Vitals:    07/28/23 1029   BP: 124/84   Pulse: 105   Resp: 19   Temp: 97.3 °F (36.3 °C)   SpO2: 98%        /84 (BP Location: Right arm, Patient Position: Sitting, Cuff Size: Large Adult)   Pulse 105   Temp 97.3 °F (36.3 °C)  "(Temporal)   Resp 19   Ht 167.6 cm (65.98\")   Wt 114 kg (252 lb 6.4 oz)   SpO2 98% Comment: room air  BMI 40.76 kg/m²     Wt Readings from Last 3 Encounters:   07/28/23 114 kg (252 lb 6.4 oz)   03/16/23 120 kg (265 lb)   01/12/23 123 kg (272 lb 3.2 oz)       Result Review :                Physical Exam  Vitals reviewed.   Constitutional:       Appearance: Normal appearance. She is well-developed.   HENT:      Head: Normocephalic and atraumatic.   Eyes:      General:         Right eye: No discharge.         Left eye: No discharge.   Cardiovascular:      Rate and Rhythm: Normal rate and regular rhythm.      Heart sounds: Normal heart sounds. No murmur heard.    No friction rub. No gallop.   Pulmonary:      Effort: Pulmonary effort is normal. No respiratory distress.      Breath sounds: Normal breath sounds. No wheezing or rales.   Skin:     General: Skin is warm and dry.      Findings: No rash.   Neurological:      Mental Status: She is alert and oriented to person, place, and time.      Coordination: Coordination normal.      Gait: Gait normal.   Psychiatric:         Behavior: Behavior is cooperative.     She is doing well with her ozempic with no adverse effects    Assessment and Plan:  Problems Addressed this Visit          Endocrine and Metabolic    Class 3 severe obesity due to excess calories with serious comorbidity and body mass index (BMI) of 40.0 to 44.9 in adult - Primary     Patient's (Body mass index is 40.76 kg/m².) indicates that they are obese (BMI >30) with health conditions that include dyslipidemias . Weight is improving with treatment. BMI  is above average; BMI management plan is completed. We discussed portion control and increasing exercise.     Many types of weight loss programs attempted with no success. Exercise has not helped with weight loss  There is no family history of MEN tumors or thyroid cancer. Advised GLP1 medications can cause nausea, vomiting, diarrhea, constipation, " pancreatitis. If there is any hoarseness, neck swelling, advised to stop the medication and let me know. If there is severe abdominal pain or intractable vomiting, dehydration, advised to go to the ER            Diagnoses         Codes Comments    Class 3 severe obesity due to excess calories with serious comorbidity and body mass index (BMI) of 40.0 to 44.9 in adult    -  Primary ICD-10-CM: E66.01, Z68.41  ICD-9-CM: 278.01, V85.41                     An After Visit Summary and PPPS were given to the patient.

## 2023-07-28 ENCOUNTER — OFFICE VISIT (OUTPATIENT)
Dept: FAMILY MEDICINE CLINIC | Facility: CLINIC | Age: 53
End: 2023-07-28
Payer: COMMERCIAL

## 2023-07-28 VITALS
SYSTOLIC BLOOD PRESSURE: 124 MMHG | BODY MASS INDEX: 40.56 KG/M2 | WEIGHT: 252.4 LBS | OXYGEN SATURATION: 98 % | HEIGHT: 66 IN | TEMPERATURE: 97.3 F | RESPIRATION RATE: 19 BRPM | HEART RATE: 105 BPM | DIASTOLIC BLOOD PRESSURE: 84 MMHG

## 2023-07-28 DIAGNOSIS — E66.01 CLASS 3 SEVERE OBESITY DUE TO EXCESS CALORIES WITH SERIOUS COMORBIDITY AND BODY MASS INDEX (BMI) OF 40.0 TO 44.9 IN ADULT: Primary | ICD-10-CM

## 2023-07-28 PROBLEM — K85.90 ACUTE PANCREATITIS: Status: RESOLVED | Noted: 2022-08-11 | Resolved: 2023-07-28

## 2023-07-28 PROBLEM — Z91.018 ALLERGY TO ALPHA-GAL: Status: ACTIVE | Noted: 2023-07-28

## 2023-07-28 NOTE — ASSESSMENT & PLAN NOTE
Patient's (Body mass index is 40.76 kg/m².) indicates that they are obese (BMI >30) with health conditions that include dyslipidemias . Weight is improving with treatment. BMI  is above average; BMI management plan is completed. We discussed portion control and increasing exercise.     Many types of weight loss programs attempted with no success. Exercise has not helped with weight loss  There is no family history of MEN tumors or thyroid cancer. Advised GLP1 medications can cause nausea, vomiting, diarrhea, constipation, pancreatitis. If there is any hoarseness, neck swelling, advised to stop the medication and let me know. If there is severe abdominal pain or intractable vomiting, dehydration, advised to go to the ER

## 2023-09-26 ENCOUNTER — TELEPHONE (OUTPATIENT)
Dept: FAMILY MEDICINE CLINIC | Facility: CLINIC | Age: 53
End: 2023-09-26
Payer: COMMERCIAL

## 2023-09-26 NOTE — TELEPHONE ENCOUNTER
Can you find her a spot today or this week?        I’m having my usual fall symptoms, coughing non productive, sneezing, sinus pressure and I can feel and hear bronchial rubs when coughing. 02 is 98% room air and temp 98.0, resp 20 HR 88, bp 122/76. I’ve been using anlbuterol inhaler as needed and Sudafed which helps temporarily but I’m going on day 3 with increased coughing.  Can u send an RX please?  Thank you Maria Del Carmen

## 2023-09-28 ENCOUNTER — TELEPHONE (OUTPATIENT)
Dept: FAMILY MEDICINE CLINIC | Facility: CLINIC | Age: 53
End: 2023-09-28
Payer: COMMERCIAL

## 2023-09-28 NOTE — TELEPHONE ENCOUNTER
I need to see her sometime.       I’m have severe coughing attacks with thick clear mucous. I’m using cool mist humidifier, Flonase, singulair, albuterol inhaler, 12 hour psuedafed, and zpack. Is there anything else you prescribe?

## 2023-09-28 NOTE — TELEPHONE ENCOUNTER
Patient refused appointment on Monday. I told her if we have a cancellation tomorrow I will call her to be put on.

## 2023-10-02 ENCOUNTER — OFFICE VISIT (OUTPATIENT)
Dept: FAMILY MEDICINE CLINIC | Facility: CLINIC | Age: 53
End: 2023-10-02
Payer: COMMERCIAL

## 2023-10-02 VITALS
OXYGEN SATURATION: 98 % | HEIGHT: 66 IN | BODY MASS INDEX: 41.06 KG/M2 | SYSTOLIC BLOOD PRESSURE: 128 MMHG | TEMPERATURE: 97.3 F | DIASTOLIC BLOOD PRESSURE: 68 MMHG | WEIGHT: 255.5 LBS | RESPIRATION RATE: 18 BRPM | HEART RATE: 92 BPM

## 2023-10-02 DIAGNOSIS — J45.21 MILD INTERMITTENT ASTHMA WITH ACUTE EXACERBATION: ICD-10-CM

## 2023-10-02 DIAGNOSIS — R05.1 ACUTE COUGH: Primary | ICD-10-CM

## 2023-10-02 PROBLEM — J06.9 VIRAL UPPER RESPIRATORY TRACT INFECTION: Status: ACTIVE | Noted: 2023-10-02

## 2023-10-02 PROCEDURE — 99213 OFFICE O/P EST LOW 20 MIN: CPT | Performed by: FAMILY MEDICINE

## 2023-10-02 RX ORDER — PREDNISONE 20 MG/1
20 TABLET ORAL DAILY
Qty: 7 TABLET | Refills: 0 | Status: SHIPPED | OUTPATIENT
Start: 2023-10-02 | End: 2023-10-09

## 2023-10-02 RX ORDER — FLUTICASONE FUROATE, UMECLIDINIUM BROMIDE AND VILANTEROL TRIFENATATE 100; 62.5; 25 UG/1; UG/1; UG/1
1 POWDER RESPIRATORY (INHALATION)
Qty: 14 EACH | Refills: 0 | COMMUNITY
Start: 2023-10-02

## 2023-10-02 RX ORDER — HYDROCODONE BITARTRATE AND HOMATROPINE METHYLBROMIDE ORAL SOLUTION 5; 1.5 MG/5ML; MG/5ML
5 LIQUID ORAL EVERY 8 HOURS PRN
Qty: 120 ML | Refills: 0 | Status: SHIPPED | OUTPATIENT
Start: 2023-10-02

## 2023-10-02 RX ORDER — CEFDINIR 300 MG/1
300 CAPSULE ORAL
COMMUNITY
Start: 2023-10-01 | End: 2023-10-11

## 2023-10-02 NOTE — PROGRESS NOTES
Chief Complaint   Patient presents with    URI    Cough       Subjective   Maria Del Carmen Esquivel is a 53 y.o. female.     History of Present Illness  Patient reports that she is having coughing attacks. Reports she had coughing attack so bad this AM that it caused vomiting.     Patient was seen at urgent care on 09/26/2023 and given azithromycin.    Maria Del Carmen was seen again at the [facility] on 10/01/2023 (yesterday).  Dx: acute maxillary sinusitis.  Vitals and oxygen sats normal.  She was prescribed cefdinir 300 mg BID x 10 days, advised to use Delsym OTC PRN for cough and Afrin PRN for nasal congestion.    Patient reports she gets bronchitis yearly.  Chart shows h/o asthma/reactive airway disease.    URI   This is a new problem. The problem has been gradually worsening. There has been no fever. Associated symptoms include congestion, coughing, rhinorrhea, sinus pain, vomiting and wheezing. Pertinent negatives include no chest pain, rash or swollen glands. Associated symptoms comments: Coughing worsens with talking, exertion.. She has tried antihistamine and decongestant (z-jason) for the symptoms. The treatment provided mild relief.      Past Medical History :  Active Ambulatory Problems     Diagnosis Date Noted    Abnormal stress test 09/23/2019    Adult situational stress disorder 09/23/2019    Allergic rhinitis due to pollen 09/23/2019    Arthritis 09/23/2019    Asymptomatic varicose veins of bilateral lower extremities 09/23/2019    Carpal tunnel syndrome 04/16/2014    DDD (degenerative disc disease), cervical 09/23/2019    Degenerative disc disease at L5-S1 level 09/23/2019    Family history of diabetes mellitus 09/23/2019    Fibromyalgia 09/23/2019    Generalized osteoarthritis 09/23/2019    GERD (gastroesophageal reflux disease) 09/23/2019    Herpes zoster without complication 09/23/2019    History of varicella 09/23/2019    Cervical radiculopathy 04/16/2014    Iron deficiency anemia 09/23/2019    Mild  intermittent asthma without complication 09/23/2019    Class 3 severe obesity due to excess calories with serious comorbidity and body mass index (BMI) of 40.0 to 44.9 in adult 09/23/2019    Plantar fasciitis, right 09/23/2019    Edema 05/20/2020    Dysuria 01/03/2022    Lateral epicondylitis of right elbow 03/31/2022    Anxiety 03/31/2022    Neck pain 03/31/2022    Palpitations 08/11/2022    Mixed hyperlipidemia 08/18/2022    Allergy to alpha-gal 07/28/2023    Viral upper respiratory tract infection 10/02/2023     Resolved Ambulatory Problems     Diagnosis Date Noted    Discomfort of chest wall 05/20/2020    Acute non-recurrent maxillary sinusitis 04/12/2021    Reactive airway disease without complication 01/03/2022    Other chest pain 03/31/2022    Acute pancreatitis 08/11/2022    Dyslipidemia 08/11/2022     Past Medical History:   Diagnosis Date    Hyperlipidemia     Mild intermittent asthma with acute exacerbation 09/23/2019    Overweight 09/23/2019    Radiculopathy 09/23/2019       Medication List:    Current Outpatient Medications:     cefdinir (OMNICEF) 300 MG capsule, Take 1 capsule by mouth., Disp: , Rfl:     fluticasone (FLONASE) 50 MCG/ACT nasal spray, 2 sprays into the nostril(s) as directed by provider Daily., Disp: , Rfl:     Loratadine 10 MG capsule, Take  by mouth., Disp: , Rfl:     montelukast (SINGULAIR) 10 MG tablet, TAKE 1 TABLET BY MOUTH EVERY DAY AT NIGHT, Disp: 90 tablet, Rfl: 3    omeprazole (priLOSEC) 20 MG capsule, TAKE 1 CAPSULE BY MOUTH EVERY DAY, Disp: 30 capsule, Rfl: 5    rosuvastatin (CRESTOR) 5 MG tablet, TAKE 1 TABLET BY MOUTH EVERY DAY, Disp: 30 tablet, Rfl: 12    traZODone (DESYREL) 50 MG tablet, TAKE 1 TABLET BY MOUTH EVERYDAY AT BEDTIME, Disp: 30 tablet, Rfl: 3    Allergies   Allergen Reactions    Beef-Derived Products Anaphylaxis    Pork-Derived Products Anaphylaxis       Social History     Tobacco Use    Smoking status: Never    Smokeless tobacco: Never   Substance Use Topics  "   Alcohol use: No         Review of Systems   Constitutional:  Negative for fever.   HENT:  Positive for congestion, postnasal drip and rhinorrhea. Negative for swollen glands.    Respiratory:  Positive for cough and wheezing.    Cardiovascular:  Negative for chest pain.   Gastrointestinal:  Positive for vomiting.   Skin:  Negative for rash.       Objective   Vitals:    10/02/23 1309   BP: 128/68   BP Location: Right arm   Patient Position: Sitting   Cuff Size: Adult   Pulse: 92   Resp: 18   Temp: 97.3 °F (36.3 °C)   TempSrc: Temporal   SpO2: 98%   Weight: 116 kg (255 lb 8 oz)   Height: 167.6 cm (66\")     Body mass index is 41.24 kg/m².  No LMP recorded. Patient is postmenopausal.      Physical Exam  Constitutional:       General: She is not in acute distress.     Appearance: Normal appearance. She is well-developed.   HENT:      Head: Normocephalic and atraumatic.      Right Ear: Tympanic membrane, ear canal and external ear normal.      Left Ear: Tympanic membrane, ear canal and external ear normal.      Nose: Nose normal.      Mouth/Throat:      Mouth: Mucous membranes are moist.      Pharynx: No oropharyngeal exudate or posterior oropharyngeal erythema.   Eyes:      General: No scleral icterus.        Right eye: No discharge.         Left eye: No discharge.      Extraocular Movements: Extraocular movements intact.      Conjunctiva/sclera: Conjunctivae normal.   Cardiovascular:      Rate and Rhythm: Normal rate and regular rhythm.      Heart sounds: Normal heart sounds. No murmur heard.  Pulmonary:      Effort: Pulmonary effort is normal.      Breath sounds: Examination of the right-upper field reveals decreased breath sounds. Examination of the left-upper field reveals decreased breath sounds. Examination of the right-lower field reveals decreased breath sounds. Examination of the left-lower field reveals decreased breath sounds. Decreased breath sounds present. No wheezing, rhonchi or rales.      Comments: " Difficulty with deep breathing, wheeze with coughing  Abdominal:      Palpations: Abdomen is soft.   Musculoskeletal:         General: No swelling.      Cervical back: Normal range of motion and neck supple.      Right lower leg: No edema.      Left lower leg: No edema.   Skin:     General: Skin is warm.      Capillary Refill: Capillary refill takes less than 2 seconds.      Findings: No rash.   Neurological:      General: No focal deficit present.      Mental Status: She is alert.      Cranial Nerves: No cranial nerve deficit.       Assessment & Plan     Diagnoses and all orders for this visit:    1. Acute cough (Primary)  -     Fluticasone-Umeclidin-Vilant (Trelegy Ellipta) 100-62.5-25 MCG/ACT inhaler; Inhale 1 puff Daily.  Dispense: 14 each; Refill: 0  -     predniSONE (DELTASONE) 20 MG tablet; Take 1 tablet by mouth Daily for 7 days.  Dispense: 7 tablet; Refill: 0  -     HYDROcodone Bit-Homatrop MBr (HYCODAN) 5-1.5 MG/5ML solution; Take 5 mL by mouth Every 8 (Eight) Hours As Needed for Cough.  Dispense: 120 mL; Refill: 0    2. Mild intermittent asthma with acute exacerbation  -     Fluticasone-Umeclidin-Vilant (Trelegy Ellipta) 100-62.5-25 MCG/ACT inhaler; Inhale 1 puff Daily.  Dispense: 14 each; Refill: 0  -     predniSONE (DELTASONE) 20 MG tablet; Take 1 tablet by mouth Daily for 7 days.  Dispense: 7 tablet; Refill: 0    Acute bronchitis with reactive airway component.    Start low dosage prednisone daily.    She was also given a Trelegy inhaler.  If effective, she was advised to call for Breo Rx (no Breo samples available in office today).    Take antibiotics as prescribed by the .  Imaging deferred as normal sats and no rales on exam.    Work note given.  F/U in 7-10 days if not improving.    Return if symptoms worsen or fail to improve.       Patient was given instructions and counseling regarding his/her condition or for health maintenance advice. Please see specific information pulled into the AVS if  appropriate.     I wore protective equipment throughout this patient encounter to include mask. Hand hygiene was performed before donning protective equipment and after removal when leaving the room.

## 2023-10-02 NOTE — LETTER
October 2, 2023     Patient: Maria Del Carmen Esquivel   YOB: 1970   Date of Visit: 10/2/2023       To Whom It May Concern:    Please excuse Maria Del Carmen Esquivel from work 10/2/23 and 10/3/23.       Sincerely,        Nava Chance MD

## 2023-10-02 NOTE — ASSESSMENT & PLAN NOTE
Patient's (Body mass index is 41.24 kg/m².) indicates that they are morbidly/severely obese (BMI > 40 or > 35 with obesity - related health condition) with health conditions that include dyslipidemias and GERD . Weight is unchanged. BMI  is above average; BMI management plan is completed. We discussed portion control and increasing exercise.

## 2023-10-12 DIAGNOSIS — E78.2 MIXED HYPERLIPIDEMIA: ICD-10-CM

## 2023-10-12 RX ORDER — ROSUVASTATIN CALCIUM 5 MG/1
TABLET, COATED ORAL
Qty: 90 TABLET | Refills: 0 | Status: SHIPPED | OUTPATIENT
Start: 2023-10-12

## 2023-10-16 ENCOUNTER — OFFICE VISIT (OUTPATIENT)
Dept: FAMILY MEDICINE CLINIC | Facility: CLINIC | Age: 53
End: 2023-10-16
Payer: COMMERCIAL

## 2023-10-16 VITALS
OXYGEN SATURATION: 98 % | HEIGHT: 66 IN | DIASTOLIC BLOOD PRESSURE: 72 MMHG | HEART RATE: 80 BPM | RESPIRATION RATE: 18 BRPM | WEIGHT: 260.6 LBS | BODY MASS INDEX: 41.88 KG/M2 | TEMPERATURE: 97.1 F | SYSTOLIC BLOOD PRESSURE: 130 MMHG

## 2023-10-16 DIAGNOSIS — J32.9 FREQUENT SINUS INFECTIONS: ICD-10-CM

## 2023-10-16 DIAGNOSIS — J31.0 CHRONIC RHINITIS: ICD-10-CM

## 2023-10-16 DIAGNOSIS — R09.81 NASAL CONGESTION: Primary | ICD-10-CM

## 2023-10-16 LAB
EXPIRATION DATE: 0
FLUAV AG UPPER RESP QL IA.RAPID: NOT DETECTED
FLUBV AG UPPER RESP QL IA.RAPID: NOT DETECTED
INTERNAL CONTROL: NORMAL
Lab: 0
SARS-COV-2 AG UPPER RESP QL IA.RAPID: NOT DETECTED

## 2023-10-16 PROCEDURE — 87428 SARSCOV & INF VIR A&B AG IA: CPT | Performed by: STUDENT IN AN ORGANIZED HEALTH CARE EDUCATION/TRAINING PROGRAM

## 2023-10-16 PROCEDURE — 99213 OFFICE O/P EST LOW 20 MIN: CPT | Performed by: STUDENT IN AN ORGANIZED HEALTH CARE EDUCATION/TRAINING PROGRAM

## 2023-10-16 RX ORDER — PREDNISONE 5 MG/1
TABLET ORAL
Qty: 87 TABLET | Refills: 0 | Status: SHIPPED | OUTPATIENT
Start: 2023-10-16 | End: 2023-11-06

## 2023-10-16 NOTE — PROGRESS NOTES
"Subjective   Maria Del Carmen Esquivel is a 53 y.o. female.   Chief Complaint   Patient presents with    Headache    Nasal Congestion       History of Present Illness       URI symptoms:  -Started: 4 weeks ago  -Symptoms: severe headache, sinus pressure, congestion (improving), teeth on left side painful, ear pain (left is worse), fatigue,  - Denies: vomiting, diarrhea/constipation, change in appetite, body aches, fevers  - resolved: Nausea, rhinorrhea, dry coughing (after trilogy)  -Treatment:   Medrol pack, Cephtn, Z-jason, Trilogy inhaler with slight improvement    - has been to urgent care (9/26/23): recommended flonase, antihistamine, singulair, albuterol. Given azithromycin pack  - Dr Mcdaniel/After hours (10/1/2023): gave her cefdinir 300mg BID for 10 days due to sneezing and sinus pressure then. Advised delsym otc prn for cough and afrin for nasal congestion  - Saw Dr. Zavaleta 10/2/23: was given trelegy ellipta, prednisone 20mg for 7 days and hycodan 5-1.5mg/ml Q8 hours. Was told pt would have this cough for a while  - cough really improved after trelegy addition  - sinus pressure has really worsened in the last few days.  This morning, patient states that her cheekbones on her face felt \"bruised\" and tender to the touch  -Has been using Flonase, pseudoephedrine (dries her out).  NyQuil in place of Sudafed but is not working as well.  Has been using phenylephrine  -Patient states she gets bronchitis annually in the fall  -Patient states she has talk to PCP about potential surgery.  Patient states she has not seen ENT yet      The following portions of the patient's history were reviewed and updated as appropriate: allergies, current medications, past family history, past medical history, past social history, past surgical history, and problem list.    Patient Active Problem List   Diagnosis    Abnormal stress test    Adult situational stress disorder    Allergic rhinitis due to pollen    Arthritis    Asymptomatic " varicose veins of bilateral lower extremities    Carpal tunnel syndrome    DDD (degenerative disc disease), cervical    Degenerative disc disease at L5-S1 level    Family history of diabetes mellitus    Fibromyalgia    Generalized osteoarthritis    GERD (gastroesophageal reflux disease)    Herpes zoster without complication    History of varicella    Cervical radiculopathy    Iron deficiency anemia    Mild intermittent asthma without complication    Class 3 severe obesity due to excess calories with serious comorbidity and body mass index (BMI) of 40.0 to 44.9 in adult    Plantar fasciitis, right    Edema    Dysuria    Lateral epicondylitis of right elbow    Anxiety    Neck pain    Palpitations    Mixed hyperlipidemia    Allergy to alpha-gal    Viral upper respiratory tract infection       Current Outpatient Medications on File Prior to Visit   Medication Sig Dispense Refill    fluticasone (FLONASE) 50 MCG/ACT nasal spray 2 sprays into the nostril(s) as directed by provider Daily.      Loratadine 10 MG capsule Take  by mouth.      montelukast (SINGULAIR) 10 MG tablet TAKE 1 TABLET BY MOUTH EVERY DAY AT NIGHT 90 tablet 3    omeprazole (priLOSEC) 20 MG capsule TAKE 1 CAPSULE BY MOUTH EVERY DAY 30 capsule 5    rosuvastatin (CRESTOR) 5 MG tablet TAKE 1 TABLET BY MOUTH EVERY DAY 90 tablet 0    traZODone (DESYREL) 50 MG tablet TAKE 1 TABLET BY MOUTH EVERYDAY AT BEDTIME 30 tablet 3    Fluticasone-Umeclidin-Vilant (Trelegy Ellipta) 100-62.5-25 MCG/ACT inhaler Inhale 1 puff Daily. (Patient not taking: Reported on 10/16/2023) 14 each 0    HYDROcodone Bit-Homatrop MBr (HYCODAN) 5-1.5 MG/5ML solution Take 5 mL by mouth Every 8 (Eight) Hours As Needed for Cough. (Patient not taking: Reported on 10/16/2023) 120 mL 0     No current facility-administered medications on file prior to visit.     Current outpatient and discharge medications have been reconciled for the patient.  Reviewed by: Kira Marion DO      Allergies  "  Allergen Reactions    Beef-Derived Products Anaphylaxis    Pork-Derived Products Anaphylaxis         Objective   Visit Vitals  Pulse 80   Temp 97.1 °F (36.2 °C) (Skin)   Resp 18   Ht 167.6 cm (66\")   Wt 118 kg (260 lb 9.6 oz)   SpO2 98%   BMI 42.06 kg/m²       Physical Exam  HENT:      Head: Normocephalic and atraumatic.      Comments: - Some discomfort with pressure on TMJ with opening of the jaw     Ears:      Comments: - Bilateral tympanic membrane is negative for erythema, exudate or bulging.  Both have serous fluid behind tympanic membranes     Mouth/Throat:      Mouth: Mucous membranes are moist.      Pharynx: Oropharynx is clear. No oropharyngeal exudate or posterior oropharyngeal erythema.   Eyes:      Conjunctiva/sclera: Conjunctivae normal.   Cardiovascular:      Rate and Rhythm: Normal rate and regular rhythm.      Heart sounds: Normal heart sounds.   Pulmonary:      Effort: Pulmonary effort is normal. No respiratory distress.      Breath sounds: Normal breath sounds. No wheezing, rhonchi or rales.   Neurological:      General: No focal deficit present.      Mental Status: She is alert and oriented to person, place, and time.   Psychiatric:         Mood and Affect: Mood normal.         Behavior: Behavior normal.           Diagnoses and all orders for this visit:    1. Nasal congestion (Primary)/sinus pressure  -     POCT SARS-CoV-2 Antigen STAR: All negative  -Discussed with patient that her resolving symptoms are typically more in line with infectious causes but her remaining symptoms seem to be more in line with allergic/rhinitis  -Patient currently is doing Flonase, encouraged her to continue Singulair, use phenylephrine for decongestion  -We will send in longer prednisone taper to help give her some relief until she can be seen by ENT  -     predniSONE (DELTASONE) 5 MG tablet; Take 8 tablets by mouth Daily for 3 days, THEN 6 tablets Daily for 3 days, THEN 5 tablets Daily for 3 days, THEN 4 tablets " Daily for 3 days, THEN 3 tablets Daily for 3 days, THEN 2 tablets Daily for 3 days, THEN 1 tablet Daily for 3 days.  Dispense: 87 tablet; Refill: 0    2. Chronic rhinitis/frequent sinus infections  -     Ambulatory Referral to ENT (Otolaryngology): [facility]           Follow Up  -As needed    Expected course, medications, and adverse effects discussed as appropriate.  Call or return if worsening or persistent symptoms.  I wore protective equipment throughout this patient encounter to include mask and eye protection. Hand hygiene was performed before donning protective equipment and after removal when leaving the room.    This document is intended for medical expert use only. Reading of this document by patients and/or patient's family without participating medical staff guidance may result in misinterpretation and unintended morbidity. Any interpretation of such data is the responsibility of the patient and/or family member responsible for the patient in concert with their primary or specialist providers, not to be left for sources of online searches such as [employer], [employer] or similar queries. Relying on these approaches to knowledge may result in misinterpretation, misguided goals of care and even death should patients or family members try recommendations outside of the realm of professional medical care.

## 2023-10-16 NOTE — ASSESSMENT & PLAN NOTE
Patient's (Body mass index is 42.06 kg/m².) indicates that they are obese (BMI >30) with health conditions that include dyslipidemias and GERD . Weight is unchanged. BMI  is above average; BMI management plan is completed. We discussed portion control and increasing exercise.

## 2023-10-24 ENCOUNTER — TELEPHONE (OUTPATIENT)
Dept: FAMILY MEDICINE CLINIC | Facility: CLINIC | Age: 53
End: 2023-10-24
Payer: COMMERCIAL

## 2023-10-24 NOTE — TELEPHONE ENCOUNTER
Pt called office 10/24/2023, 08:00 am she states she is having palpitations from the steroids and very bad headache.    Concerned if she stops the steroids the URI will return

## 2023-10-24 NOTE — TELEPHONE ENCOUNTER
Spoke to pt 10/24/2023, 08:31 am advised pt of suggestions per Dr. Marion.      Offered to make an appt with a provider, but pt declines.    She states she will taper the steroids, if no improvement she will call the office

## 2023-10-24 NOTE — TELEPHONE ENCOUNTER
I would reassure the patient that I doubt this is an actual upper respiratory infection, and this is more nasal congestion and allergy type symptoms that were being persistent as steroids do not treat an infection but they help manage some symptoms of inflammation that would occur due to an infection or allergies.    She is on 9 days of the steroid taper (next step in the taper should be 20mg for 3 days).  She has been on the steroids long enough that I would have her do 20 mg for 2 days, 10 mg for 2 days then 5 mg for 2 (3 days if she can handle that) days to taper her off a little bit better (rather than just stop them abruptly).  Hopefully she will not have the same symptoms to the lower doses

## 2023-11-16 RX ORDER — TRAZODONE HYDROCHLORIDE 50 MG/1
TABLET ORAL
Qty: 90 TABLET | Refills: 1 | Status: SHIPPED | OUTPATIENT
Start: 2023-11-16

## 2023-11-20 DIAGNOSIS — K21.9 GASTROESOPHAGEAL REFLUX DISEASE, UNSPECIFIED WHETHER ESOPHAGITIS PRESENT: ICD-10-CM

## 2023-11-20 RX ORDER — OMEPRAZOLE 20 MG/1
CAPSULE, DELAYED RELEASE ORAL
Qty: 90 CAPSULE | Refills: 1 | Status: SHIPPED | OUTPATIENT
Start: 2023-11-20

## 2024-01-04 ENCOUNTER — OFFICE VISIT (OUTPATIENT)
Dept: FAMILY MEDICINE CLINIC | Facility: CLINIC | Age: 54
End: 2024-01-04

## 2024-01-04 VITALS
SYSTOLIC BLOOD PRESSURE: 122 MMHG | DIASTOLIC BLOOD PRESSURE: 80 MMHG | HEART RATE: 102 BPM | BODY MASS INDEX: 43.01 KG/M2 | HEIGHT: 66 IN | OXYGEN SATURATION: 99 % | WEIGHT: 267.6 LBS | TEMPERATURE: 96.9 F | RESPIRATION RATE: 18 BRPM

## 2024-01-04 DIAGNOSIS — E66.01 CLASS 3 SEVERE OBESITY DUE TO EXCESS CALORIES WITH SERIOUS COMORBIDITY AND BODY MASS INDEX (BMI) OF 40.0 TO 44.9 IN ADULT: Primary | ICD-10-CM

## 2024-01-04 DIAGNOSIS — J45.20 MILD INTERMITTENT ASTHMA WITHOUT COMPLICATION: ICD-10-CM

## 2024-01-04 DIAGNOSIS — J06.9 VIRAL URI: ICD-10-CM

## 2024-01-04 DIAGNOSIS — R05.1 ACUTE COUGH: ICD-10-CM

## 2024-01-04 DIAGNOSIS — J45.21 MILD INTERMITTENT ASTHMA WITH ACUTE EXACERBATION: ICD-10-CM

## 2024-01-04 LAB
EXPIRATION DATE: ABNORMAL
FLUAV AG UPPER RESP QL IA.RAPID: ABNORMAL
FLUBV AG UPPER RESP QL IA.RAPID: ABNORMAL
INTERNAL CONTROL: ABNORMAL
Lab: ABNORMAL
SARS-COV-2 AG UPPER RESP QL IA.RAPID: ABNORMAL

## 2024-01-04 RX ORDER — HYDROCODONE BITARTRATE AND HOMATROPINE METHYLBROMIDE ORAL SOLUTION 5; 1.5 MG/5ML; MG/5ML
5 LIQUID ORAL EVERY 8 HOURS PRN
Qty: 120 ML | Refills: 0 | Status: SHIPPED | OUTPATIENT
Start: 2024-01-04

## 2024-01-04 RX ORDER — FLUTICASONE FUROATE, UMECLIDINIUM BROMIDE AND VILANTEROL TRIFENATATE 100; 62.5; 25 UG/1; UG/1; UG/1
1 POWDER RESPIRATORY (INHALATION)
Qty: 14 EACH | Refills: 5 | COMMUNITY
Start: 2024-01-04 | End: 2024-01-08

## 2024-01-04 RX ORDER — AMOXICILLIN 500 MG/1
500 TABLET, FILM COATED ORAL 3 TIMES DAILY
Qty: 30 TABLET | Refills: 0 | Status: SHIPPED | OUTPATIENT
Start: 2024-01-04

## 2024-01-04 NOTE — ASSESSMENT & PLAN NOTE
Worse 2nd to illness.  Advised to avoid dust and smoke exposure.  Patient requested a refill of Trelegy she states it worked quite well.  Refill of Trelegy sent.  Continue Singulair.  Benefits outweigh the risk.

## 2024-01-04 NOTE — ASSESSMENT & PLAN NOTE
New dx. COVID and flu swab are negative.--Patient given PRN Amoxicillin.  Advised at the present there is no evidence of a bacterial infection thus it was best to hold a few days prior to starting this.  Pt. Requested cough medication to help sleep.  Advised to increase fluids and rest.  Benefits and risk of hydrocodone discussed she will try and use this minimally

## 2024-01-04 NOTE — PROGRESS NOTES
Subjective   Maria Del Carmen Esquivel is a 53 y.o. female.     Asthma  She complains of cough and shortness of breath. There is no wheezing. This is a chronic problem. The current episode started more than 1 year ago. The problem occurs constantly. The problem has been gradually worsening. Associated symptoms include headaches and myalgias. Pertinent negatives include no ear pain, fever or sore throat. Her past medical history is significant for asthma.   URI   This is a new problem. The current episode started 1 to 4 weeks ago (12-27-23). The problem has been unchanged. There has been no fever. Associated symptoms include congestion, coughing, headaches and sinus pain. Pertinent negatives include no diarrhea, ear pain, joint pain, nausea, rash, sore throat, vomiting or wheezing. The treatment provided no relief.        The following portions of the patient's history were reviewed and updated as appropriate: allergies, current medications, past family history, past medical history, past social history, past surgical history, and problem list.    Family History   Problem Relation Age of Onset    Hyperlipidemia Mother     Diabetes Mother     Heart disease Father     Heart attack Father     Hyperlipidemia Father     Diabetes Father     Heart disease Maternal Uncle        Social History     Tobacco Use    Smoking status: Never    Smokeless tobacco: Never   Vaping Use    Vaping Use: Never used   Substance Use Topics    Alcohol use: No    Drug use: No       Past Surgical History:   Procedure Laterality Date    CHOLECYSTECTOMY      LAPAROSCOPIC TUBAL LIGATION         Patient Active Problem List   Diagnosis    Abnormal stress test    Adult situational stress disorder    Allergic rhinitis due to pollen    Arthritis    Asymptomatic varicose veins of bilateral lower extremities    Carpal tunnel syndrome    DDD (degenerative disc disease), cervical    Degenerative disc disease at L5-S1 level    Family history of diabetes mellitus     Fibromyalgia    Generalized osteoarthritis    GERD (gastroesophageal reflux disease)    Herpes zoster without complication    History of varicella    Cervical radiculopathy    Iron deficiency anemia    Mild intermittent asthma without complication    Class 3 severe obesity due to excess calories with serious comorbidity and body mass index (BMI) of 40.0 to 44.9 in adult    Plantar fasciitis, right    Edema    Dysuria    Lateral epicondylitis of right elbow    Anxiety    Neck pain    Palpitations    Mixed hyperlipidemia    Allergy to alpha-gal    Viral upper respiratory tract infection    Viral URI       Current Outpatient Medications on File Prior to Visit   Medication Sig Dispense Refill    fluticasone (FLONASE) 50 MCG/ACT nasal spray 2 sprays into the nostril(s) as directed by provider Daily.      Loratadine 10 MG capsule Take  by mouth.      montelukast (SINGULAIR) 10 MG tablet TAKE 1 TABLET BY MOUTH EVERY DAY AT NIGHT 90 tablet 3    omeprazole (priLOSEC) 20 MG capsule TAKE 1 CAPSULE BY MOUTH EVERY DAY 90 capsule 1    rosuvastatin (CRESTOR) 5 MG tablet TAKE 1 TABLET BY MOUTH EVERY DAY 90 tablet 0    traZODone (DESYREL) 50 MG tablet TAKE 1 TABLET BY MOUTH EVERYDAY AT BEDTIME (Patient not taking: Reported on 1/4/2024) 90 tablet 1    [DISCONTINUED] Fluticasone-Umeclidin-Vilant (Trelegy Ellipta) 100-62.5-25 MCG/ACT inhaler Inhale 1 puff Daily. (Patient not taking: Reported on 10/16/2023) 14 each 0    [DISCONTINUED] HYDROcodone Bit-Homatrop MBr (HYCODAN) 5-1.5 MG/5ML solution Take 5 mL by mouth Every 8 (Eight) Hours As Needed for Cough. (Patient not taking: Reported on 10/16/2023) 120 mL 0     No current facility-administered medications on file prior to visit.       Allergies   Allergen Reactions    Beef-Derived Products Anaphylaxis    Pork-Derived Products Anaphylaxis       Review of Systems   Constitutional:  Positive for fatigue. Negative for fever.   HENT:  Positive for congestion. Negative for ear pain, sore  "throat and voice change.    Respiratory:  Positive for cough and shortness of breath. Negative for wheezing.    Gastrointestinal:  Negative for diarrhea, nausea and vomiting.   Musculoskeletal:  Positive for myalgias. Negative for joint pain.   Skin:  Negative for rash.   Neurological:  Negative for headache.       Objective   Visit Vitals  /80 (BP Location: Right arm, Patient Position: Sitting, Cuff Size: Adult)   Pulse 102   Temp 96.9 °F (36.1 °C) (Temporal)   Resp 18   Ht 167.6 cm (66\")   Wt 121 kg (267 lb 9.6 oz)   SpO2 99%   BMI 43.19 kg/m²     Physical Exam  Vitals and nursing note reviewed.   Constitutional:       Appearance: She is well-developed.   HENT:      Head: Normocephalic.      Right Ear: Tympanic membrane, ear canal and external ear normal. No middle ear effusion. There is no impacted cerumen.      Left Ear: Tympanic membrane, ear canal and external ear normal.  No middle ear effusion. There is no impacted cerumen.      Nose: No septal deviation, mucosal edema or congestion.      Right Sinus: No maxillary sinus tenderness or frontal sinus tenderness.      Left Sinus: No maxillary sinus tenderness or frontal sinus tenderness.      Mouth/Throat:      Mouth: No oral lesions.      Pharynx: No oropharyngeal exudate.      Tonsils: No tonsillar abscesses.   Neck:      Thyroid: No thyromegaly.      Vascular: No carotid bruit.      Trachea: Trachea normal.   Cardiovascular:      Rate and Rhythm: Normal rate and regular rhythm.      Heart sounds: No murmur heard.     No friction rub. No gallop.   Pulmonary:      Effort: Pulmonary effort is normal. No respiratory distress.      Breath sounds: Normal breath sounds. No wheezing.   Chest:      Chest wall: No tenderness.   Musculoskeletal:      Cervical back: Neck supple.   Skin:     General: Skin is dry.      Findings: No rash.      Nails: There is no clubbing.   Neurological:      Mental Status: She is alert and oriented to person, place, and time. "   Psychiatric:         Behavior: Behavior is cooperative.           Assessment & Plan .  Problem List Items Addressed This Visit          Unprioritized    Class 3 severe obesity due to excess calories with serious comorbidity and body mass index (BMI) of 40.0 to 44.9 in adult - Primary    Mild intermittent asthma without complication    Current Assessment & Plan     Worse 2nd to illness.  Advised to avoid dust and smoke exposure.  Patient requested a refill of Trelegy she states it worked quite well.  Refill of Trelegy sent.  Continue Singulair.  Benefits outweigh the risk.           Relevant Medications    Fluticasone-Umeclidin-Vilant (Trelegy Ellipta) 100-62.5-25 MCG/ACT inhaler    Viral URI    Current Assessment & Plan     New dx. COVID and flu swab are negative.--Patient given PRN Amoxicillin.  Advised at the present there is no evidence of a bacterial infection thus it was best to hold a few days prior to starting this.  Pt. Requested cough medication to help sleep.  Advised to increase fluids and rest.  Benefits and risk of hydrocodone discussed she will try and use this minimally         Relevant Medications    amoxicillin (AMOXIL) 500 MG tablet     Other Visit Diagnoses       Acute cough        Relevant Medications    Fluticasone-Umeclidin-Vilant (Trelegy Ellipta) 100-62.5-25 MCG/ACT inhaler    HYDROcodone Bit-Homatrop MBr (HYCODAN) 5-1.5 MG/5ML solution    Other Relevant Orders    POCT SARS-CoV-2 Antigen STAR + Flu    Mild intermittent asthma with acute exacerbation        Relevant Medications    Fluticasone-Umeclidin-Vilant (Trelegy Ellipta) 100-62.5-25 MCG/ACT inhaler

## 2024-01-05 ENCOUNTER — TELEPHONE (OUTPATIENT)
Dept: FAMILY MEDICINE CLINIC | Facility: CLINIC | Age: 54
End: 2024-01-05

## 2024-01-05 NOTE — TELEPHONE ENCOUNTER
Caller: Maria Del Carmen Esquivel    Relationship: Self    Best call back number: [phone]     Which medication are you concerned about: TRELEGY    Who prescribed you this medication: ESTILL    When did you start taking this medication: HAS NOT STARTED TAKING THE MEDICINE DUE TO PRICE.    What are your concerns: SINGLE FILL .00. SHE WOULD LIKE TO TRY BREO BECAUSE IT .00 WITH GOOD RX CARD. PLEASE CALL PATIENT TO DISCUSS MEDICINE CHANGE.       Include Location In Plan?: No Detail Level: Zone

## 2024-01-08 ENCOUNTER — TELEPHONE (OUTPATIENT)
Dept: FAMILY MEDICINE CLINIC | Facility: CLINIC | Age: 54
End: 2024-01-08

## 2024-01-08 DIAGNOSIS — J45.20 MILD INTERMITTENT ASTHMA WITHOUT COMPLICATION: Primary | ICD-10-CM

## 2024-01-08 RX ORDER — FLUTICASONE FUROATE AND VILANTEROL 100; 25 UG/1; UG/1
1 POWDER RESPIRATORY (INHALATION)
Qty: 1 EACH | Refills: 0 | Status: SHIPPED | OUTPATIENT
Start: 2024-01-08

## 2024-01-08 NOTE — TELEPHONE ENCOUNTER
----- Message from Trini Chávez MA sent at 1/8/2024  8:56 AM EST -----  Regarding: FW: Trelegy Inhaler   Contact: [phone]    ----- Message -----  From: Holly Bergeron  Sent: 1/8/2024   8:13 AM EST  To: Trini Chávez MA  Subject: FW: Trelegy Inhaler                                ----- Message -----  From: Maria Del Carmen Esquivel  Sent: 1/4/2024   4:46 PM EST  To: Eamon Menard  Clinical Pool  Subject: Trelegy Inhaler                                  I called the pharmacy and the trelegy inhaler is $900. My Ins will not pay until I meet $7,000 def. The breo inhaler is only $150. Is it possible to try the breo first?  Thank you

## 2024-01-19 DIAGNOSIS — E78.2 MIXED HYPERLIPIDEMIA: ICD-10-CM

## 2024-01-19 RX ORDER — ROSUVASTATIN CALCIUM 5 MG/1
TABLET, COATED ORAL
Qty: 90 TABLET | Refills: 0 | Status: SHIPPED | OUTPATIENT
Start: 2024-01-19

## 2024-05-15 DIAGNOSIS — K21.9 GASTROESOPHAGEAL REFLUX DISEASE, UNSPECIFIED WHETHER ESOPHAGITIS PRESENT: ICD-10-CM

## 2024-05-16 RX ORDER — TRAZODONE HYDROCHLORIDE 50 MG/1
TABLET ORAL
Qty: 90 TABLET | Refills: 1 | Status: SHIPPED | OUTPATIENT
Start: 2024-05-16

## 2024-05-16 RX ORDER — OMEPRAZOLE 20 MG/1
CAPSULE, DELAYED RELEASE ORAL
Qty: 90 CAPSULE | Refills: 1 | Status: SHIPPED | OUTPATIENT
Start: 2024-05-16

## 2025-02-05 ENCOUNTER — OFFICE (OUTPATIENT)
Dept: URBAN - METROPOLITAN AREA CLINIC 64 | Facility: CLINIC | Age: 55
End: 2025-02-05
Payer: COMMERCIAL

## 2025-02-05 VITALS
WEIGHT: 255 LBS | HEIGHT: 66 IN | HEART RATE: 69 BPM | SYSTOLIC BLOOD PRESSURE: 136 MMHG | DIASTOLIC BLOOD PRESSURE: 87 MMHG

## 2025-02-05 DIAGNOSIS — R10.13 EPIGASTRIC PAIN: ICD-10-CM

## 2025-02-05 DIAGNOSIS — R10.11 RIGHT UPPER QUADRANT PAIN: ICD-10-CM

## 2025-02-05 PROCEDURE — 99214 OFFICE O/P EST MOD 30 MIN: CPT | Performed by: INTERNAL MEDICINE

## 2025-02-05 RX ORDER — NAPROXEN SODIUM 220 MG
440 TABLET ORAL
Qty: 28 | Refills: 0 | Status: ACTIVE
Start: 2025-02-05

## 2025-02-05 RX ORDER — GABAPENTIN 300 MG/1
600 CAPSULE ORAL
Qty: 60 | Refills: 6 | Status: ACTIVE
Start: 2025-02-05

## 2025-02-27 ENCOUNTER — ON CAMPUS - OUTPATIENT (OUTPATIENT)
Dept: URBAN - METROPOLITAN AREA HOSPITAL 77 | Facility: HOSPITAL | Age: 55
End: 2025-02-27
Payer: COMMERCIAL

## 2025-02-27 DIAGNOSIS — R10.11 RIGHT UPPER QUADRANT PAIN: ICD-10-CM

## 2025-02-27 DIAGNOSIS — R93.3 ABNORMAL FINDINGS ON DIAGNOSTIC IMAGING OF OTHER PARTS OF DI: ICD-10-CM

## 2025-02-27 DIAGNOSIS — K29.50 UNSPECIFIED CHRONIC GASTRITIS WITHOUT BLEEDING: ICD-10-CM

## 2025-02-27 PROCEDURE — 43259 EGD US EXAM DUODENUM/JEJUNUM: CPT | Performed by: INTERNAL MEDICINE

## 2025-02-27 PROCEDURE — 43239 EGD BIOPSY SINGLE/MULTIPLE: CPT | Mod: 59 | Performed by: INTERNAL MEDICINE

## 2025-05-08 ENCOUNTER — OFFICE (OUTPATIENT)
Dept: URBAN - METROPOLITAN AREA CLINIC 64 | Facility: CLINIC | Age: 55
End: 2025-05-08
Payer: COMMERCIAL

## 2025-05-08 VITALS
HEART RATE: 66 BPM | DIASTOLIC BLOOD PRESSURE: 84 MMHG | HEIGHT: 66 IN | SYSTOLIC BLOOD PRESSURE: 136 MMHG | WEIGHT: 263 LBS

## 2025-05-08 DIAGNOSIS — R10.13 EPIGASTRIC PAIN: ICD-10-CM

## 2025-05-08 DIAGNOSIS — K29.70 GASTRITIS, UNSPECIFIED, WITHOUT BLEEDING: ICD-10-CM

## 2025-05-08 PROCEDURE — 99212 OFFICE O/P EST SF 10 MIN: CPT | Performed by: NURSE PRACTITIONER
